# Patient Record
Sex: FEMALE | Race: WHITE | NOT HISPANIC OR LATINO | Employment: FULL TIME | URBAN - METROPOLITAN AREA
[De-identification: names, ages, dates, MRNs, and addresses within clinical notes are randomized per-mention and may not be internally consistent; named-entity substitution may affect disease eponyms.]

---

## 2017-03-13 ENCOUNTER — OFFICE VISIT (OUTPATIENT)
Dept: FAMILY MEDICINE | Facility: CLINIC | Age: 47
End: 2017-03-13
Payer: COMMERCIAL

## 2017-03-13 VITALS
DIASTOLIC BLOOD PRESSURE: 62 MMHG | TEMPERATURE: 98.6 F | HEART RATE: 74 BPM | HEIGHT: 65 IN | BODY MASS INDEX: 27.57 KG/M2 | RESPIRATION RATE: 16 BRPM | OXYGEN SATURATION: 97 % | WEIGHT: 165.5 LBS | SYSTOLIC BLOOD PRESSURE: 122 MMHG

## 2017-03-13 DIAGNOSIS — R19.8 SUPRAPUBIC FULLNESS: ICD-10-CM

## 2017-03-13 DIAGNOSIS — N94.9 VAGINAL SYMPTOM: ICD-10-CM

## 2017-03-13 DIAGNOSIS — R39.89 URINARY PROBLEM: Primary | ICD-10-CM

## 2017-03-13 LAB
ALBUMIN UR-MCNC: NEGATIVE MG/DL
APPEARANCE UR: ABNORMAL
BILIRUB UR QL STRIP: NEGATIVE
COLOR UR AUTO: YELLOW
GLUCOSE UR STRIP-MCNC: NEGATIVE MG/DL
HGB UR QL STRIP: ABNORMAL
KETONES UR STRIP-MCNC: NEGATIVE MG/DL
LEUKOCYTE ESTERASE UR QL STRIP: NEGATIVE
MICRO REPORT STATUS: NORMAL
NITRATE UR QL: NEGATIVE
PH UR STRIP: 6.5 PH (ref 5–7)
RBC #/AREA URNS AUTO: NORMAL /HPF (ref 0–2)
SP GR UR STRIP: 1.02 (ref 1–1.03)
SPECIMEN SOURCE: NORMAL
URN SPEC COLLECT METH UR: ABNORMAL
UROBILINOGEN UR STRIP-ACNC: 0.2 EU/DL (ref 0.2–1)
WBC #/AREA URNS AUTO: NORMAL /HPF (ref 0–2)
WET PREP SPEC: NORMAL

## 2017-03-13 PROCEDURE — 81001 URINALYSIS AUTO W/SCOPE: CPT | Performed by: PHYSICIAN ASSISTANT

## 2017-03-13 PROCEDURE — 87210 SMEAR WET MOUNT SALINE/INK: CPT | Performed by: PHYSICIAN ASSISTANT

## 2017-03-13 PROCEDURE — 99213 OFFICE O/P EST LOW 20 MIN: CPT | Performed by: PHYSICIAN ASSISTANT

## 2017-03-13 NOTE — NURSING NOTE
"Chief Complaint   Patient presents with     Urinary Problem     since last night       Initial /62 (BP Location: Right arm, Patient Position: Chair, Cuff Size: Adult Large)  Pulse 74  Temp 98.6  F (37  C) (Tympanic)  Resp 16  Ht 5' 5\" (1.651 m)  Wt 165 lb 8 oz (75.1 kg)  LMP 09/22/2004  SpO2 97%  Breastfeeding? No  BMI 27.54 kg/m2 Estimated body mass index is 27.54 kg/(m^2) as calculated from the following:    Height as of this encounter: 5' 5\" (1.651 m).    Weight as of this encounter: 165 lb 8 oz (75.1 kg).  Medication Reconciliation: complete     Patient would like to be notified at the following phone number for results from this visit   [phone] OK to leave message or Artis Gibson CMA (AAMA) 3/13/2017 1:51 PM      "

## 2017-03-13 NOTE — PROGRESS NOTES
SUBJECTIVE:                                                    Valeri Wise is a 47 year old female who presents to clinic today for the following health issues:    URINARY TRACT SYMPTOMS      Duration: since last night    Description  Urgency, fullness feeling in bladder after urination    Intensity:  mild    Accompanying signs and symptoms:  Fever/chills: no   Flank pain no   Nausea and vomiting: no   Vaginal symptoms: none  Abdominal/Pelvic Pain: YES- cramping sensation last night    History  History of frequent UTI's: no   History of kidney stones: no   Sexually Active: YES  Possibility of pregnancy: No    Precipitating or alleviating factors: None    Therapies tried and outcome: Probiotic   Outcome: No relief      -Patient presents with a new onset abd cramping last night   -this caused her to strain a BM quite hard and since constant urinary  urgency  -Since then she has needed to urinate quite frequently, now urinating frequently though not great amounts  -there is no painful urinary, no bleeding  -there is a bit more vaginal discharge than normal, otherwise normal  -BMs were normal  -there is no other abdominal pain other than the suprapubic pressure    Problem list and histories reviewed & adjusted, as indicated.  Additional history: as documented    Patient Active Problem List   Diagnosis     Disturbance in sleep behavior     HYPERLIPIDEMIA LDL GOAL <160     DDD (degenerative disc disease), cervical     Seasonal allergic rhinitis     Anxiety     Past Surgical History   Procedure Laterality Date     C nonspecific procedure       Jaw surgery--corrective     C nonspecific procedure            C vaginal hysterectomy  10/2004     Hysterectomy, pap no longer indicated         Social History   Substance Use Topics     Smoking status: Former Smoker     Smokeless tobacco: Never Used      Comment:      Alcohol use No     Family History   Problem Relation Age of Onset     HEART DISEASE Maternal  "Grandfather      CANCER Maternal Grandfather      leukemia     DIABETES Maternal Grandfather      Breast Cancer No family hx of      Cancer - colorectal No family hx of      C.A.D. Maternal Grandmother            Reviewed and updated as needed this visit by clinical staff       Reviewed and updated as needed this visit by Provider         ROS:  Constitutional, HEENT, cardiovascular, pulmonary, gi and gu systems are negative, except as otherwise noted.    OBJECTIVE:                                                    /62 (BP Location: Right arm, Patient Position: Chair, Cuff Size: Adult Large)  Pulse 74  Temp 98.6  F (37  C) (Tympanic)  Resp 16  Ht 5' 5\" (1.651 m)  Wt 165 lb 8 oz (75.1 kg)  LMP 09/22/2004  SpO2 97%  Breastfeeding? No  BMI 27.54 kg/m2  Body mass index is 27.54 kg/(m^2).  GENERAL: healthy, alert and no distress  RESP: lungs clear to auscultation - no rales, rhonchi or wheezes  CV: regular rates and rhythm  ABDOMEN: tenderness suprapubic, no organomegaly or masses and bowel sounds hyperactive   (female): normal female external genitalia; vaginal mucosa pink, moist, well rugated; No evident cervix. No obvious evidence for prolapse on manual or speculum exam while supine, and with valsalva  MS: no gross musculoskeletal defects noted, no edema    Diagnostic Test Results:  Results for orders placed or performed in visit on 03/13/17 (from the past 24 hour(s))   UA reflex to Microscopic and Culture   Result Value Ref Range    Color Urine Yellow     Appearance Urine Slightly Cloudy     Glucose Urine Negative NEG mg/dL    Bilirubin Urine Negative NEG    Ketones Urine Negative NEG mg/dL    Specific Gravity Urine 1.020 1.003 - 1.035    Blood Urine Small (A) NEG    pH Urine 6.5 5.0 - 7.0 pH    Protein Albumin Urine Negative NEG mg/dL    Urobilinogen Urine 0.2 0.2 - 1.0 EU/dL    Nitrite Urine Negative NEG    Leukocyte Esterase Urine Negative NEG    Source Midstream Urine    Wet prep   Result Value Ref Range "    Specimen Description Vagina     Wet Prep       No yeast seen  No clue cells seen  No Trichomonas seen      Micro Report Status FINAL 03/13/2017    Urine Microscopic   Result Value Ref Range    WBC Urine O - 2 0 - 2 /HPF    RBC Urine O - 2 0 - 2 /HPF        ASSESSMENT/PLAN:                                                    1. Urinary problem  2. Suprapubic fullness  3. Vaginal symptom  46yo female with new onset symptoms beginning yesterday following straining during BM last night. UA is grossly normal save for small hematuria. She continues to have urgency and frequency with little output. I do wonder about prolapse given the symptoms and would like her to follow up closely with obgyn who will be able to better investigate. Graciously Dr. Hodgson has agreed to squeeze her in early tomorrow.   - UA reflex to Microscopic and Culture; Future  - Wet prep; Future  - UA reflex to Microscopic and Culture  - Wet prep  - Urine Microscopic  - OB/GYN REFERRAL    Ryan Keating PA-C  [facility]

## 2017-03-13 NOTE — MR AVS SNAPSHOT
After Visit Summary   3/13/2017    Valeri Wise    MRN: [id number]           Patient Information     Date Of Birth          1970        Visit Information        Provider Department      3/13/2017 1:20 PM Ryan Keating PA-C [facility]        Today's Diagnoses     Urinary problem    -  1    Suprapubic fullness        Vaginal symptom           Follow-ups after your visit        Additional Services     OB/GYN REFERRAL       Your provider has referred you to:  [facility]: [facility] [phone]   [email or web address]    Please be aware that coverage of these services is subject to the terms and limitations of your health insurance plan.  Call member services at your health plan with any benefit or coverage questions.      Please bring the following with you to your appointment:    (1) Any X-Rays, CTs or MRIs which have been performed.  Contact the facility where they were done to arrange for  prior to your scheduled appointment.   (2) List of current medications   (3) This referral request   (4) Any documents/labs given to you for this referral                  Who to contact     If you have questions or need follow up information about today's clinic visit or your schedule please contact [facility] directly at [phone].  Normal or non-critical lab and imaging results will be communicated to you by MyChart, letter or phone within 4 business days after the clinic has received the results. If you do not hear from us within 7 days, please contact the clinic through MyChart or phone. If you have a critical or abnormal lab result, we will notify you by phone as soon as possible.  Submit refill requests through [email or web address] or call your pharmacy and they will forward the refill request to us. Please allow 3 business days for your refill to be completed.          Additional Information About Your Visit        MyChart Information   "   [employer] gives you secure access to your electronic health record. If you see a primary care provider, you can also send messages to your care team and make appointments. If you have questions, please call your primary care clinic.  If you do not have a primary care provider, please call [phone] and they will assist you.        Care EveryWhere ID     This is your Care EveryWhere ID. This could be used by other organizations to access your [place] medical records  [id number]        Your Vitals Were     Pulse Temperature Respirations Height Last Period Pulse Oximetry    74 98.6  F (37  C) (Tympanic) 16 5' 5\" (1.651 m) 09/22/2004 97%    Breastfeeding? BMI (Body Mass Index)                No 27.54 kg/m2           Blood Pressure from Last 3 Encounters:   03/13/17 122/62   12/05/16 126/78   09/22/15 132/80    Weight from Last 3 Encounters:   03/13/17 165 lb 8 oz (75.1 kg)   12/05/16 166 lb 14.4 oz (75.7 kg)   09/22/15 162 lb (73.5 kg)              We Performed the Following     OB/GYN REFERRAL     UA reflex to Microscopic and Culture     Urine Microscopic     Wet prep        Primary Care Provider Office Phone # Fax #    Tatiana Oseguera [phone]       [facility] [address] [facility] [address]        Thank you!     Thank you for choosing [facility]  for your care. Our goal is always to provide you with excellent care. Hearing back from our patients is one way we can continue to improve our services. Please take a few minutes to complete the written survey that you may receive in the mail after your visit with us. Thank you!             Your Updated Medication List - Protect others around you: Learn how to safely use, store and throw away your medicines at www.disposemymeds.org.          This list is accurate as of: 3/13/17  2:37 PM.  Always use your most recent med list.                   Brand Name Dispense Instructions for use    citalopram 20 MG " tablet    celeXA    90 tablet    Take 1 tablet (20 mg) by mouth daily       gabapentin 300 MG capsule    NEURONTIN    270 capsule    Take 1 capsule (300 mg) by mouth 3 times daily

## 2017-03-28 ENCOUNTER — OFFICE VISIT (OUTPATIENT)
Dept: OBGYN | Facility: CLINIC | Age: 47
End: 2017-03-28
Payer: COMMERCIAL

## 2017-03-28 VITALS
DIASTOLIC BLOOD PRESSURE: 80 MMHG | SYSTOLIC BLOOD PRESSURE: 122 MMHG | HEART RATE: 72 BPM | BODY MASS INDEX: 27.12 KG/M2 | WEIGHT: 163 LBS

## 2017-03-28 DIAGNOSIS — Z01.411 ENCOUNTER FOR GYNECOLOGICAL EXAMINATION WITH ABNORMAL FINDING: ICD-10-CM

## 2017-03-28 LAB
FSH SERPL-ACNC: 5.3 IU/L
LH SERPL-ACNC: 13.2 IU/L

## 2017-03-28 PROCEDURE — 87624 HPV HI-RISK TYP POOLED RSLT: CPT | Performed by: OBSTETRICS & GYNECOLOGY

## 2017-03-28 PROCEDURE — G0145 SCR C/V CYTO,THINLAYER,RESCR: HCPCS | Performed by: OBSTETRICS & GYNECOLOGY

## 2017-03-28 PROCEDURE — 83002 ASSAY OF GONADOTROPIN (LH): CPT | Performed by: OBSTETRICS & GYNECOLOGY

## 2017-03-28 PROCEDURE — 83001 ASSAY OF GONADOTROPIN (FSH): CPT | Performed by: OBSTETRICS & GYNECOLOGY

## 2017-03-28 PROCEDURE — 36415 COLL VENOUS BLD VENIPUNCTURE: CPT | Performed by: OBSTETRICS & GYNECOLOGY

## 2017-03-28 PROCEDURE — 99203 OFFICE O/P NEW LOW 30 MIN: CPT | Performed by: OBSTETRICS & GYNECOLOGY

## 2017-03-28 NOTE — MR AVS SNAPSHOT
After Visit Summary   3/28/2017    Valeri Wise    MRN: [id number]           Patient Information     Date Of Birth          1970        Visit Information        Provider Department      3/28/2017 2:00 PM Wilfredo Hodgson MD [facility]        Today's Diagnoses     Cystocele, unspecified cystocele location    -  1    Encounter for gynecological examination with abnormal finding           Follow-ups after your visit        Follow-up notes from your care team     Return in about 1 year (around 3/28/2018), or if symptoms worsen or fail to improve.      Who to contact     If you have questions or need follow up information about today's clinic visit or your schedule please contact [facility] directly at [phone].  Normal or non-critical lab and imaging results will be communicated to you by MyChart, letter or phone within 4 business days after the clinic has received the results. If you do not hear from us within 7 days, please contact the clinic through Rebelle[employer] or phone. If you have a critical or abnormal lab result, we will notify you by phone as soon as possible.  Submit refill requests through [employer] or call your pharmacy and they will forward the refill request to us. Please allow 3 business days for your refill to be completed.          Additional Information About Your Visit        MyChart Information     [employer] gives you secure access to your electronic health record. If you see a primary care provider, you can also send messages to your care team and make appointments. If you have questions, please call your primary care clinic.  If you do not have a primary care provider, please call [phone] and they will assist you.        Care EveryWhere ID     This is your Care EveryWhere ID. This could be used by other organizations to access your [place] medical records  [id number]        Your Vitals Were     Pulse Last Period BMI (Body Mass Index)             72  09/22/2004 27.12 kg/m2          Blood Pressure from Last 3 Encounters:   03/28/17 122/80   03/13/17 122/62   12/05/16 126/78    Weight from Last 3 Encounters:   03/28/17 163 lb (73.9 kg)   03/13/17 165 lb 8 oz (75.1 kg)   12/05/16 166 lb 14.4 oz (75.7 kg)              We Performed the Following     Follicle stimulating hormone     Lutropin     Pap imaged thin layer screen with HPV - recommended age 30 - 65 years (select HPV order below)        Primary Care Provider Office Phone # Fax #    Tatiana Oseguera [phone]       [facility] [address] [facility] [address]        Thank you!     Thank you for choosing [facility]  for your care. Our goal is always to provide you with excellent care. Hearing back from our patients is one way we can continue to improve our services. Please take a few minutes to complete the written survey that you may receive in the mail after your visit with us. Thank you!             Your Updated Medication List - Protect others around you: Learn how to safely use, store and throw away your medicines at www.disposemymeds.org.          This list is accurate as of: 3/28/17 11:59 PM.  Always use your most recent med list.                   Brand Name Dispense Instructions for use    citalopram 20 MG tablet    celeXA    90 tablet    Take 1 tablet (20 mg) by mouth daily       gabapentin 300 MG capsule    NEURONTIN    270 capsule    Take 1 capsule (300 mg) by mouth 3 times daily

## 2017-03-28 NOTE — NURSING NOTE
"Chief Complaint   Patient presents with     Consult     discuss urinary issues - referred by PA in [place] - history of hysterectomy - pap smear - last pap smear 08/29/12       Initial /80 (BP Location: Right arm, Patient Position: Chair, Cuff Size: Adult Regular)  Pulse 72  Wt 163 lb (73.9 kg)  LMP 09/22/2004  BMI 27.12 kg/m2 Estimated body mass index is 27.12 kg/(m^2) as calculated from the following:    Height as of 3/13/17: 5' 5\" (1.651 m).    Weight as of this encounter: 163 lb (73.9 kg).  Medication Reconciliation: complete    Nurse assisted visit.  Chika Waller MA.    "

## 2017-03-28 NOTE — PROGRESS NOTES
SUBJECTIVE:  Valeri Wise is an 47 year old  P2 woman who presents for pap smear and concern that       -pelvic structures are falling out        Does not note loss of urine with cough or sneeze/does note urgency; symptoms        -present over last 2-3 months.  Recent U/A; negative              Vaginal hysterectomy in           Past Medical History:   Diagnosis Date     Disc degeneration     neck     Past Surgical History:   Procedure Laterality Date     C NONSPECIFIC PROCEDURE      Jaw surgery--corrective     C NONSPECIFIC PROCEDURE           C VAGINAL HYSTERECTOMY  10/2004     Current Outpatient Prescriptions   Medication     gabapentin (NEURONTIN) 300 MG capsule     citalopram (CELEXA) 20 MG tablet     No current facility-administered medications for this visit.      Allergies   Allergen Reactions     Sulfa Drugs      hives     Social History   Substance Use Topics     Smoking status: Former Smoker     Smokeless tobacco: Never Used      Comment:      Alcohol use No       Review of Systems  CONSTITUTIONAL:NEGATIVE  EYES: NEGATIVE  ENT/MOUTH: NEGATIVE  RESP: NEGATIVE  CV: NEGATIVE  GI: NEGATIVE  : NEGATIVE    OBJECTIVE:  /80 (BP Location: Right arm, Patient Position: Chair, Cuff Size: Adult Regular)  Pulse 72  Wt 163 lb (73.9 kg)  LMP 2004  BMI 27.12 kg/m2   EXAM:  GENERAL APPEARANCE: healthy, alert and no distress  ABDOMEN: soft, nontender, without hepatosplenomegaly or masses    Pelvic Exam:  Vulva: No external lesions, normal hair distribution, no adenopathy  Vagina: moderate cystocele      Pap smear of vaginal cuff-done      ASSESSMENT/PLAN:  Pelvic organ prolapse (anterior compartment)      -pathophysiology of pelvic organ prolapse reviewed over 15 minutes       -recommend vaginal estrogen and Kegel exercises      -may need urodynamics if symptoms do not improve  Urinary urgency  Pap smear            Health Maintenance   Topic Date Due     PAP Q3 YR  2015      LIPID SCREEN Q5 YR FEMALE (SYSTEM ASSIGNED)  08/29/2017     INFLUENZA VACCINE (SYSTEM ASSIGNED)  09/01/2017     TETANUS Q10 YR  08/29/2022

## 2017-03-30 LAB
COPATH REPORT: NORMAL
PAP: NORMAL

## 2017-04-03 LAB
FINAL DIAGNOSIS: NORMAL
HPV HR 12 DNA CVX QL NAA+PROBE: NEGATIVE
HPV16 DNA SPEC QL NAA+PROBE: NEGATIVE
HPV18 DNA SPEC QL NAA+PROBE: NEGATIVE
SPECIMEN DESCRIPTION: NORMAL

## 2017-06-16 DIAGNOSIS — F41.9 ANXIETY: ICD-10-CM

## 2017-06-16 RX ORDER — CITALOPRAM HYDROBROMIDE 20 MG/1
20 TABLET ORAL DAILY
Qty: 30 TABLET | Refills: 0 | Status: SHIPPED | OUTPATIENT
Start: 2017-06-16 | End: 2017-07-12

## 2017-06-16 NOTE — TELEPHONE ENCOUNTER
Patient will run out of this medication prior to appt. Please send rx to pharmacy if approving or call patient with questions or concerns.    Citalopram (CELEXA) 20 MG tablet       Last Written Prescription Date: 12/05/2016  Last Fill Quantity: 90, # refills: 1  [facility] Office [facility] with [facility] primary care provider:  03/13/2017   Next 5 appointments (look out 90 days)     Jun 27, 2017  2:30 PM CDT   Office Visit with Tatiana Oseguera MD   [facility]    [address] [place] [id number] [phone]                   Last PHQ-9 score on record=   PHQ-9 SCORE 12/5/2016   Total Score -   Total Score 10

## 2017-06-16 NOTE — TELEPHONE ENCOUNTER
Medication is being filled for 1 time refill only due to:  upcoming appt.   Jocelyn Ibarra, RN  Triage Nurse

## 2017-07-12 ENCOUNTER — OFFICE VISIT (OUTPATIENT)
Dept: FAMILY MEDICINE | Facility: CLINIC | Age: 47
End: 2017-07-12
Payer: COMMERCIAL

## 2017-07-12 VITALS
BODY MASS INDEX: 27.26 KG/M2 | RESPIRATION RATE: 16 BRPM | WEIGHT: 163.8 LBS | DIASTOLIC BLOOD PRESSURE: 68 MMHG | TEMPERATURE: 98 F | SYSTOLIC BLOOD PRESSURE: 128 MMHG | OXYGEN SATURATION: 98 % | HEART RATE: 72 BPM

## 2017-07-12 DIAGNOSIS — F41.9 ANXIETY: Primary | ICD-10-CM

## 2017-07-12 DIAGNOSIS — M50.30 DDD (DEGENERATIVE DISC DISEASE), CERVICAL: ICD-10-CM

## 2017-07-12 PROCEDURE — 99214 OFFICE O/P EST MOD 30 MIN: CPT | Performed by: INTERNAL MEDICINE

## 2017-07-12 RX ORDER — CITALOPRAM HYDROBROMIDE 20 MG/1
20 TABLET ORAL DAILY
Qty: 90 TABLET | Refills: 1 | Status: SHIPPED | OUTPATIENT
Start: 2017-07-12 | End: 2018-01-10

## 2017-07-12 ASSESSMENT — ANXIETY QUESTIONNAIRES
7. FEELING AFRAID AS IF SOMETHING AWFUL MIGHT HAPPEN: SEVERAL DAYS
6. BECOMING EASILY ANNOYED OR IRRITABLE: SEVERAL DAYS
GAD7 TOTAL SCORE: 7
IF YOU CHECKED OFF ANY PROBLEMS ON THIS QUESTIONNAIRE, HOW DIFFICULT HAVE THESE PROBLEMS MADE IT FOR YOU TO DO YOUR WORK, TAKE CARE OF THINGS AT HOME, OR GET ALONG WITH OTHER PEOPLE: SOMEWHAT DIFFICULT
3. WORRYING TOO MUCH ABOUT DIFFERENT THINGS: SEVERAL DAYS
5. BEING SO RESTLESS THAT IT IS HARD TO SIT STILL: SEVERAL DAYS
2. NOT BEING ABLE TO STOP OR CONTROL WORRYING: SEVERAL DAYS
1. FEELING NERVOUS, ANXIOUS, OR ON EDGE: SEVERAL DAYS

## 2017-07-12 ASSESSMENT — PATIENT HEALTH QUESTIONNAIRE - PHQ9: 5. POOR APPETITE OR OVEREATING: SEVERAL DAYS

## 2017-07-12 NOTE — NURSING NOTE
"Chief Complaint   Patient presents with     Anxiety     states feels like the Citalopram 20 mg may be making her a little to sleepy      Recheck Medication       Initial /68 (BP Location: Right arm, Patient Position: Chair, Cuff Size: Adult Large)  Pulse 72  Temp 98  F (36.7  C) (Oral)  Resp 16  Wt 163 lb 12.8 oz (74.3 kg)  LMP 09/22/2004  SpO2 98%  BMI 27.26 kg/m2 Estimated body mass index is 27.26 kg/(m^2) as calculated from the following:    Height as of 3/13/17: 5' 5\" (1.651 m).    Weight as of this encounter: 163 lb 12.8 oz (74.3 kg).  Medication Reconciliation: complete    "

## 2017-07-12 NOTE — PATIENT INSTRUCTIONS
Begin taking same dose of citalopram (20 MG) in the morning instead of at night     Continue taking Gabapentin at night as before.

## 2017-07-12 NOTE — MR AVS SNAPSHOT
After Visit Summary   7/12/2017    Valeri Wise    MRN: [id number]           Patient Information     Date Of Birth          1970        Visit Information        Provider Department      7/12/2017 2:30 PM Tatiana Oseguera MD [facility]        Today's Diagnoses     Anxiety    -  1    DDD (degenerative disc disease), cervical          Care Instructions    Begin taking same dose of citalopram (20 MG) in the morning instead of at night     Continue taking Gabapentin at night as before.          Follow-ups after your visit        Who to contact     If you have questions or need follow up information about today's clinic visit or your schedule please contact [facility] directly at [phone].  Normal or non-critical lab and imaging results will be communicated to you by CashYouhart, letter or phone within 4 business days after the clinic has received the results. If you do not hear from us within 7 days, please contact the clinic through [employer]You[employer] or phone. If you have a critical or abnormal lab result, we will notify you by phone as soon as possible.  Submit refill requests through [employer] or call your pharmacy and they will forward the refill request to us. Please allow 3 business days for your refill to be completed.          Additional Information About Your Visit        MyChart Information     [employer] gives you secure access to your electronic health record. If you see a primary care provider, you can also send messages to your care team and make appointments. If you have questions, please call your primary care clinic.  If you do not have a primary care provider, please call [phone] and they will assist you.        Care EveryWhere ID     This is your Care EveryWhere ID. This could be used by other organizations to access your [place] medical records  [id number]        Your Vitals Were     Pulse Temperature Respirations Last Period Pulse Oximetry BMI (Body  Mass Index)    72 98  F (36.7  C) (Oral) 16 09/22/2004 98% 27.26 kg/m2       Blood Pressure from Last 3 Encounters:   07/12/17 128/68   03/28/17 122/80   03/13/17 122/62    Weight from Last 3 Encounters:   07/12/17 163 lb 12.8 oz (74.3 kg)   03/28/17 163 lb (73.9 kg)   03/13/17 165 lb 8 oz (75.1 kg)              Today, you had the following     No orders found for display         Where to get your medicines      These medications were sent to [facility]/pharmacy #[id number] - [place], [place] - [address]  [address], [facility] [address]     Phone:  [phone]     citalopram 20 MG tablet          Primary Care Provider Office Phone # Fax #    Tatiana Oseguera [phone]       [facility] [address] [facility] [address]        Equal Access to Services     KAVON RÍOS AH: Hadii aad ku hadasho Soomaali, waaxda luqadaha, qaybta kaalmada adeegyada, waxay idiin hayaan adeeg kharash la'nickien . So [facility] [phone].    ATENCIÓN: Si habla español, tiene a barnes disposición servicios gratuitos de asistencia lingüística. Llame al [phone].    We comply with applicable federal civil rights laws and [place] laws. We do not discriminate on the basis of race, color, national origin, age, disability sex, sexual orientation or gender identity.            Thank you!     Thank you for choosing [facility]  for your care. Our goal is always to provide you with excellent care. Hearing back from our patients is one way we can continue to improve our services. Please take a few minutes to complete the written survey that you may receive in the mail after your visit with us. Thank you!             Your Updated Medication List - Protect others around you: Learn how to safely use, store and throw away your medicines at www.disposemymeds.org.          This list is accurate as of: 7/12/17  3:47 PM.  Always use your most recent med list.                   Brand Name Dispense  Instructions for use Diagnosis    citalopram 20 MG tablet    celeXA    90 tablet    Take 1 tablet (20 mg) by mouth daily    Anxiety       gabapentin 300 MG capsule    NEURONTIN    270 capsule    Take 1 capsule (300 mg) by mouth 3 times daily    Cervicalgia, DDD (degenerative disc disease), cervical

## 2017-07-12 NOTE — PROGRESS NOTES
SUBJECTIVE:                                                    Valeri Wise is a 47 year old female who presents to clinic today for the following health issues:    Anxiety Follow-Up    Status since last visit: Improved but states that she feels very tired with the increased dose of 20 mg and wonders if there is a dose that is in between.     Other associated symptoms:None    Complicating factors:   Significant life event: No   Current substance abuse: None  Depression symptoms: No  ALIYA-7 SCORE 8/28/2014 9/22/2015 12/5/2016   Total Score 7 - -   Total Score - 4 9       GAD7    Pt has a history of anxiety and is currently taking citalopram.  She feels that her recently increased dose is effective in helping her mood, but is making her very tired.    She states she takes the citalopram at night and is still tired during the day.  The patient claims that she would be very happy with her medication if she wasn't so tired.  She is requesting a change in order to reduce her fatigue.        Amount of exercise or physical activity: None    Problems taking medications regularly: No    Medication side effects: sleepiness and fatigue    Diet: regular (no restrictions)    Problem list and histories reviewed & adjusted, as indicated.  Additional history: as documented    Labs reviewed in EPIC    Reviewed and updated as needed this visit by clinical staff  Tobacco  Allergies  Meds  Med Hx  Surg Hx  Fam Hx  Soc Hx      Reviewed and updated as needed this visit by Provider       REVIEW OF SYSTEMS:  C: Positive for fatigue; NEGATIVE for fever, chills, or change in weight  R: NEGATIVE for cough or shortness of breath  CV: NEGATIVE for chest pain, palpitations or peripheral edema  M: Positive for neck pain - use of Gabapentin; past injections as recommended by [facility];   N: NEGATIVE for weakness, dizziness or paresthesias; past injections, continued use of Gabapentin  E: Hx of hyperlipidemia - no use  of statin;   P: Anxiety - use of citalopram reviewed; feels medication is effective    This document serves as a record of the services and decisions personally performed and made by Tatiana Oseguera MD. It was created on her behalf by Chaka Cummings, a trained medical scribe. The creation of this document is based the provider's statements to the medical scribe.  Chaka Cummings, July 12, 2017 3:37 PM    OBJECTIVE:   /68 (BP Location: Right arm, Patient Position: Chair, Cuff Size: Adult Large)  Pulse 72  Temp 98  F (36.7  C) (Oral)  Resp 16  Wt 163 lb 12.8 oz (74.3 kg)  LMP 09/22/2004  SpO2 98%  BMI 27.26 kg/m2  Body mass index is 27.26 kg/(m^2).    EXAM:  GENERAL: Patient appears healthy, alert and not distressed.  NECK: No adenopathy present, no asymmetry, masses, or scars noted, thyroid is normal to palpation  RESP: Lungs are clear to auscultation - no rales, rhonchi or wheezes present  CV: Regular rate and rhythm, normal S1 S2 heart sounds, no ectopy, no peripheral edema present, peripheral pulses normal, no carotid bruit.  PSYCH: Mentation appears normal, affect is normal/bright    Diagnostic Test Results:  No results found for this or any previous visit (from the past 24 hour(s)).     ASSESSMENT/PLAN:     (F41.9) Anxiety  (primary encounter diagnosis)  Comment: Citalopram 20 mg well tolerated; she is taking at night; she feels it is effective for anxiety; consider change to morning administration  Plan: Begin taking citalopram (CELEXA) 20 MG tablet in the morning instead of at night.          (M50.30) DDD (degenerative disc disease), cervical  Comment: followed by [facility] in the past; past injections.  Plan: Pt elected to not continue injections, taking gabapentin per neurology    The information in this document, created by a medical scribe for me, accurately reflects the services I personally performed and the decisions made by me. I have reviewed and approved this document for  accuracy.  Dr. Tatiana Oseguera, July 12, 2017, 3:49 PM    Tatiana Oseguera MD  Internal Medicine  [facility]

## 2017-07-13 ASSESSMENT — PATIENT HEALTH QUESTIONNAIRE - PHQ9: SUM OF ALL RESPONSES TO PHQ QUESTIONS 1-9: 6

## 2017-07-13 ASSESSMENT — ANXIETY QUESTIONNAIRES: GAD7 TOTAL SCORE: 7

## 2018-01-15 ENCOUNTER — MYC REFILL (OUTPATIENT)
Dept: FAMILY MEDICINE | Facility: CLINIC | Age: 48
End: 2018-01-15

## 2018-01-15 ENCOUNTER — E-VISIT (OUTPATIENT)
Dept: FAMILY MEDICINE | Facility: CLINIC | Age: 48
End: 2018-01-15
Payer: COMMERCIAL

## 2018-01-15 DIAGNOSIS — M50.30 DDD (DEGENERATIVE DISC DISEASE), CERVICAL: ICD-10-CM

## 2018-01-15 DIAGNOSIS — M54.2 CERVICALGIA: ICD-10-CM

## 2018-01-15 DIAGNOSIS — F41.9 ANXIETY: ICD-10-CM

## 2018-01-15 PROCEDURE — 99444 ZZC PHYSICIAN ONLINE EVALUATION & MANAGEMENT SERVICE: CPT | Performed by: INTERNAL MEDICINE

## 2018-01-15 RX ORDER — CITALOPRAM HYDROBROMIDE 20 MG/1
TABLET ORAL
Qty: 90 TABLET | Refills: 1 | Status: SHIPPED | OUTPATIENT
Start: 2018-01-15 | End: 2018-09-11

## 2018-01-15 RX ORDER — GABAPENTIN 300 MG/1
300 CAPSULE ORAL 3 TIMES DAILY
Qty: 270 CAPSULE | Refills: 1 | Status: SHIPPED | OUTPATIENT
Start: 2018-01-15 | End: 2018-07-29

## 2018-01-15 RX ORDER — GABAPENTIN 300 MG/1
300 CAPSULE ORAL 3 TIMES DAILY
Qty: 270 CAPSULE | Refills: 3 | Status: CANCELLED | OUTPATIENT
Start: 2018-01-15

## 2018-01-15 ASSESSMENT — ANXIETY QUESTIONNAIRES
7. FEELING AFRAID AS IF SOMETHING AWFUL MIGHT HAPPEN: SEVERAL DAYS
1. FEELING NERVOUS, ANXIOUS, OR ON EDGE: SEVERAL DAYS
5. BEING SO RESTLESS THAT IT IS HARD TO SIT STILL: SEVERAL DAYS
GAD7 TOTAL SCORE: 7
7. FEELING AFRAID AS IF SOMETHING AWFUL MIGHT HAPPEN: SEVERAL DAYS
6. BECOMING EASILY ANNOYED OR IRRITABLE: SEVERAL DAYS
2. NOT BEING ABLE TO STOP OR CONTROL WORRYING: SEVERAL DAYS
GAD7 TOTAL SCORE: 7
GAD7 TOTAL SCORE: 7
4. TROUBLE RELAXING: SEVERAL DAYS
3. WORRYING TOO MUCH ABOUT DIFFERENT THINGS: SEVERAL DAYS

## 2018-01-15 ASSESSMENT — PATIENT HEALTH QUESTIONNAIRE - PHQ9
SUM OF ALL RESPONSES TO PHQ QUESTIONS 1-9: 7
SUM OF ALL RESPONSES TO PHQ QUESTIONS 1-9: 7
10. IF YOU CHECKED OFF ANY PROBLEMS, HOW DIFFICULT HAVE THESE PROBLEMS MADE IT FOR YOU TO DO YOUR WORK, TAKE CARE OF THINGS AT HOME, OR GET ALONG WITH OTHER PEOPLE: SOMEWHAT DIFFICULT

## 2018-01-16 ASSESSMENT — ANXIETY QUESTIONNAIRES: GAD7 TOTAL SCORE: 7

## 2018-01-16 ASSESSMENT — PATIENT HEALTH QUESTIONNAIRE - PHQ9: SUM OF ALL RESPONSES TO PHQ QUESTIONS 1-9: 7

## 2018-01-16 NOTE — TELEPHONE ENCOUNTER
Message from [employer]:  Original authorizing provider: MD Valeri Piedra would like a refill of the following medications:  gabapentin (NEURONTIN) 300 MG capsule [Tatiana Oseguera MD]    Preferred pharmacy: [facility]Y [id number] - [facility] [address]    Comment:  I have completed the e-visit questionnaires and request a renewal of my gabapentin medicine as soon as possible. Thank you.

## 2018-01-20 ENCOUNTER — TELEPHONE (OUTPATIENT)
Dept: FAMILY MEDICINE | Facility: CLINIC | Age: 48
End: 2018-01-20

## 2018-07-29 DIAGNOSIS — M54.2 CERVICALGIA: ICD-10-CM

## 2018-07-29 DIAGNOSIS — M50.30 DDD (DEGENERATIVE DISC DISEASE), CERVICAL: ICD-10-CM

## 2018-07-29 DIAGNOSIS — F41.9 ANXIETY: ICD-10-CM

## 2018-07-30 NOTE — TELEPHONE ENCOUNTER
"Requested Prescriptions   Pending Prescriptions Disp Refills     gabapentin (NEURONTIN) 300 MG capsule [Pharmacy Med Name: GABAPENTIN 300 MG CAPSULE]  Last Written Prescription Date:  1/15/18  Last Fill Quantity: 270,  # refills: 1   Last office visit: 7/12/2017 with prescribing provider:  Tatiana Oseguera MD   Future Office Visit:   Next 5 appointments (look out 90 days)     Aug 13, 2018  2:30 PM CDT   [facility] Tat[facility]    [address] [facility] [address]   [phone]                  270 capsule 1     Sig: TAKE 1 CAPSULE (300 MG) BY MOUTH 3 TIMES DAILY    There is no refill protocol information for this order        citalopram (CELEXA) 20 MG tablet [Pharmacy Med Name: CITALOPRAM HBR 20 MG TABLET]  Last Written Prescription Date:  1/15/18  Last Fill Quantity: 90,  # refills: 1   Last office visit: 7/12/2017 with prescribing provider:  Tatiana Oseguera MD   Future Office Visit:   Next 5 appointments (look out 90 days)     Aug 13, 2018  2:30 PM CDT   Office Visit with Tatiana Oseguera MD   [facility]    [address] [facility] [address] [phone]                  90 tablet 1     Sig: TAKE 1 TABLET (20 MG) BY MOUTH DAILY    SSRIs Protocol Passed    7/29/2018  4:58 PM  PHQ-9 SCORE 12/5/2016 7/12/2017 1/15/2018   Total Score - - -   Total Score MyChart - - 7 (Mild depression)   Total Score 10 6 7     ALIYA-7 SCORE 12/5/2016 7/12/2017 1/15/2018   Total Score - - -   Total Score - - 7 (mild anxiety)   Total Score 9 7 7              Passed - Recent (12 mo) or future (30 days) visit within the authorizing provider's specialty    Patient had office visit in the last 12 months or has a visit in the next 30 days with authorizing provider or within the authorizing provider's specialty.  See \"Patient Info\" tab in inbasket, or \"Choose Columns\" in Meds & Orders " section of the refill encounter.           Passed - Patient is age 18 or older       Passed - No active pregnancy on record       Passed - No positive pregnancy test in last 12 months

## 2018-08-01 NOTE — TELEPHONE ENCOUNTER
Routing refill request to provider for review/approval because:  Drug not on the [facility] refill protocol and due for an appt, has one scheduled 8/13/18

## 2018-08-03 RX ORDER — CITALOPRAM HYDROBROMIDE 20 MG/1
TABLET ORAL
Qty: 30 TABLET | Refills: 0 | Status: SHIPPED | OUTPATIENT
Start: 2018-08-03 | End: 2018-08-28

## 2018-08-03 RX ORDER — GABAPENTIN 300 MG/1
CAPSULE ORAL
Qty: 90 CAPSULE | Refills: 0 | Status: SHIPPED | OUTPATIENT
Start: 2018-08-03 | End: 2018-09-11

## 2018-08-03 NOTE — TELEPHONE ENCOUNTER
Chart reviewed; pt had E-Visit 1/15/2018 and was due to follow up in 6 months- 7/15/2018.  appt not scheduled until 8/13.  Will fill interim RX but will need to follow up BEFORE Rx runs out in the future.

## 2018-08-28 DIAGNOSIS — F41.9 ANXIETY: ICD-10-CM

## 2018-08-28 RX ORDER — CITALOPRAM HYDROBROMIDE 20 MG/1
TABLET ORAL
Qty: 30 TABLET | Refills: 0 | Status: SHIPPED | OUTPATIENT
Start: 2018-08-28 | End: 2018-09-11

## 2018-08-28 NOTE — TELEPHONE ENCOUNTER
Requested Prescriptions   Pending Prescriptions Disp Refills     citalopram (CELEXA) 20 MG tablet 30 tablet 0    There is no refill protocol information for this order        Patient would like a refill of this medication. She does not have enough to last until her next appt on 9/11/18 with Dr. Oseguera.

## 2018-08-28 NOTE — TELEPHONE ENCOUNTER
"Medication is being filled for 1 time refill only due to:  Patient needs to be seen because it has been more than one year since last visit. Needs f/u on anxiety meds.   Huddled with Dr. Oseguera on this and she is in agreement. Patient must keep Sept 11 appt before further refills.    Last Written Prescription Date:  8/3/18  Last Fill Quantity: 30,  # refills: 0   Last office visit: 7/12/2017 with prescribing provider:  Ana Rosa   Future Office Visit:   Next 5 appointments (look out 90 days)     Sep 11, 2018  3:30 PM CDT   Office Visit with Tatiana Oseguera MD   [facility]    [address] [place] [address] [phone]                 Requested Prescriptions   Pending Prescriptions Disp Refills     citalopram (CELEXA) 20 MG tablet 30 tablet 0    SSRIs Protocol Passed    8/28/2018 11:57 AM       Passed - Recent (12 mo) or future (30 days) visit within the authorizing provider's specialty    Patient had office visit in the last 12 months or has a visit in the next 30 days with authorizing provider or within the authorizing provider's specialty.  See \"Patient Info\" tab in inbasket, or \"Choose Columns\" in Meds & Orders section of the refill encounter.           Passed - Patient is age 18 or older       Passed - No active pregnancy on record       Passed - No positive pregnancy test in last 12 months        Mike Chicas RN    "

## 2018-09-11 ENCOUNTER — OFFICE VISIT (OUTPATIENT)
Dept: FAMILY MEDICINE | Facility: CLINIC | Age: 48
End: 2018-09-11
Payer: COMMERCIAL

## 2018-09-11 VITALS
DIASTOLIC BLOOD PRESSURE: 80 MMHG | SYSTOLIC BLOOD PRESSURE: 124 MMHG | TEMPERATURE: 98.2 F | WEIGHT: 159.1 LBS | OXYGEN SATURATION: 97 % | HEART RATE: 70 BPM | BODY MASS INDEX: 26.51 KG/M2 | HEIGHT: 65 IN | RESPIRATION RATE: 15 BRPM

## 2018-09-11 DIAGNOSIS — M54.2 CERVICALGIA: ICD-10-CM

## 2018-09-11 DIAGNOSIS — F41.9 ANXIETY: ICD-10-CM

## 2018-09-11 DIAGNOSIS — M50.30 DDD (DEGENERATIVE DISC DISEASE), CERVICAL: ICD-10-CM

## 2018-09-11 PROCEDURE — 99214 OFFICE O/P EST MOD 30 MIN: CPT | Performed by: INTERNAL MEDICINE

## 2018-09-11 RX ORDER — GABAPENTIN 300 MG/1
CAPSULE ORAL
Qty: 270 CAPSULE | Refills: 3 | Status: SHIPPED | OUTPATIENT
Start: 2018-09-11 | End: 2019-09-24

## 2018-09-11 RX ORDER — CITALOPRAM HYDROBROMIDE 20 MG/1
TABLET ORAL
Qty: 90 TABLET | Refills: 3 | Status: SHIPPED | OUTPATIENT
Start: 2018-09-11 | End: 2020-01-20

## 2018-09-11 ASSESSMENT — ANXIETY QUESTIONNAIRES
2. NOT BEING ABLE TO STOP OR CONTROL WORRYING: NOT AT ALL
5. BEING SO RESTLESS THAT IT IS HARD TO SIT STILL: NOT AT ALL
6. BECOMING EASILY ANNOYED OR IRRITABLE: SEVERAL DAYS
7. FEELING AFRAID AS IF SOMETHING AWFUL MIGHT HAPPEN: NOT AT ALL
GAD7 TOTAL SCORE: 4
IF YOU CHECKED OFF ANY PROBLEMS ON THIS QUESTIONNAIRE, HOW DIFFICULT HAVE THESE PROBLEMS MADE IT FOR YOU TO DO YOUR WORK, TAKE CARE OF THINGS AT HOME, OR GET ALONG WITH OTHER PEOPLE: SOMEWHAT DIFFICULT
3. WORRYING TOO MUCH ABOUT DIFFERENT THINGS: SEVERAL DAYS
1. FEELING NERVOUS, ANXIOUS, OR ON EDGE: SEVERAL DAYS

## 2018-09-11 ASSESSMENT — PATIENT HEALTH QUESTIONNAIRE - PHQ9: 5. POOR APPETITE OR OVEREATING: SEVERAL DAYS

## 2018-09-11 NOTE — PROGRESS NOTES
SUBJECTIVE:   Valeri Wise is a 48 year old female who presents to clinic today for the following health issues:        Anxiety Follow-Up    Status since last visit: stable    Other associated symptoms:None    Complicating factors:   Significant life event: Yes-  Daughter has gone off to college   Current substance abuse: None  Depression symptoms: No  ALIYA-7 SCORE 2017 1/15/2018 2018   Total Score - - -   Total Score - 7 (mild anxiety) -   Total Score 7 7 4       ALIYA-7    Amount of exercise or physical activity: None    Problems taking medications regularly: No    Medication side effects: none    Diet: gluten-free/reduced        Neck Pain      Duration: longstanding ; years;  Past MRI with chronic changes.     Description:  Location: neck  Radiation: at times has radiation down her arm    No change in symptoms and Gabapentin helps with the discomfort; past evaluation, no injections.    Intensity:  mild    Accompanying signs and symptoms: none    History (similar episodes/previous evaluation): None    Precipitating or alleviating factors: disc bulge     Therapies tried and outcome: gabapentin       Problem list and histories reviewed & adjusted, as indicated.  Additional history: as documented    Patient Active Problem List   Diagnosis     Disturbance in sleep behavior     HYPERLIPIDEMIA LDL GOAL <160     DDD (degenerative disc disease), cervical     Seasonal allergic rhinitis     Anxiety     Cystocele, unspecified cystocele location     BELLE II (cervical intraepithelial neoplasia II)     Past Surgical History:   Procedure Laterality Date     C NONSPECIFIC PROCEDURE      Jaw surgery--corrective     C NONSPECIFIC PROCEDURE           C VAGINAL HYSTERECTOMY  10/2004       Social History   Substance Use Topics     Smoking status: Former Smoker     Smokeless tobacco: Never Used      Comment:      Alcohol use No     Family History   Problem Relation Age of Onset     HEART DISEASE Maternal  "Grandfather      Cancer Maternal Grandfather      leukemia     Diabetes Maternal Grandfather      C.A.D. Maternal Grandmother      Breast Cancer No family hx of      Cancer - colorectal No family hx of          Current Outpatient Prescriptions   Medication Sig Dispense Refill     citalopram (CELEXA) 20 MG tablet TAKE 1 TABLET (20 MG) BY MOUTH DAILY 90 tablet 3     gabapentin (NEURONTIN) 300 MG capsule TAKE 1 CAPSULE (300 MG) BY MOUTH 3 TIMES DAILY 270 capsule 3     Allergies   Allergen Reactions     Sulfa Drugs      hives     BP Readings from Last 3 Encounters:   09/11/18 124/80   07/12/17 128/68   03/28/17 122/80    Wt Readings from Last 3 Encounters:   09/11/18 159 lb 1.6 oz (72.2 kg)   07/12/17 163 lb 12.8 oz (74.3 kg)   03/28/17 163 lb (73.9 kg)                  Labs reviewed in EPIC    Reviewed and updated as needed this visit by clinical staff  Tobacco  Allergies  Meds  Problems  Med Hx  Surg Hx  Fam Hx  Soc Hx        Reviewed and updated as needed this visit by Provider  Allergies  Meds  Problems         ROS:  CONSTITUTIONAL: NEGATIVE for fever, chills, change in weight  ENT/MOUTH: NEGATIVE for ear, mouth and throat problems  RESP: NEGATIVE for significant cough or SOB  CV: NEGATIVE for chest pain, palpitations or peripheral edema  GI: NEGATIVE for nausea, abdominal pain, heartburn, or change in bowel habits  MUSCULOSKELETAL: chronic neck pain and periodic radicular symptoms; not recent  NEURO: denies weakness; periodic radicular symptoms L>R  ENDOCRINE: NEGATIVE for temperature intolerance, skin/hair changes  PSYCHIATRIC: anxious; stressors reviewed;     OBJECTIVE:     /80  Pulse 70  Temp 98.2  F (36.8  C) (Oral)  Resp 15  Ht 5' 4.6\" (1.641 m)  Wt 159 lb 1.6 oz (72.2 kg)  LMP 09/22/2004  SpO2 97%  BMI 26.8 kg/m2  Body mass index is 26.8 kg/(m^2).  GENERAL: healthy, alert and no distress  NECK: no adenopathy, no asymmetry, masses, or scars and thyroid normal to palpation  RESP: lungs " clear to auscultation - no rales, rhonchi or wheezes  CV: regular rates and rhythm, normal S1 S2, no S3 or S4, no murmur, click or rub, peripheral pulses strong and no peripheral edema  ABDOMEN: soft, nontender, no hepatosplenomegaly, no masses and bowel sounds normal  MS: extremities normal- no gross deformities noted  SKIN: no suspicious lesions or rashes  NEURO: Normal strength and tone, sensory exam grossly normal, mentation intact and DTR's normal and symmetric   PSYCH: mentation appears normal, affect normal/bright  PHQ-9 SCORE 7/12/2017 1/15/2018 9/11/2018   Total Score - - -   Total Score MyChart - 7 (Mild depression) -   Total Score 6 7 4     ALIYA-7 SCORE 7/12/2017 1/15/2018 9/11/2018   Total Score - - -   Total Score - 7 (mild anxiety) -   Total Score 7 7 4       ASSESSMENT/PLAN:     (F41.9) Anxiety  Comment: review use of med; feels Citalopram 20 mg effective;   Stressors reviewed; ALIYA and PHQ-9  Improved, both now 4  Plan: citalopram (CELEXA) 20 MG tablet          (M54.2) Cervicalgia  Comment: Cervical DDD; saw Neuro and had MRI 2007; reviewed MRI with pt; severe changes at C5-C6; strength preserved.  Plan: gabapentin (NEURONTIN) 300 MG capsule- reviewed dosing of medication.          (M50.30) DDD (degenerative disc disease), cervical  Comment: reviewed dosing of medication. Well tolerated  Plan: gabapentin (NEURONTIN) 300 MG capsule            Tatiana Oseguera MD  Internal Medicine   [facility]

## 2018-09-11 NOTE — MR AVS SNAPSHOT
"              After Visit Summary   9/11/2018    Valeri Wise    MRN: [id number]           Patient Information     Date Of Birth          1970        Visit Information        Provider Department      9/11/2018 3:30 PM Tatiana Oseguera MD [facility]        Today's Diagnoses     Anxiety        Cervicalgia        DDD (degenerative disc disease), cervical           Follow-ups after your visit        Follow-up notes from your care team     Return in about 1 year (around 9/11/2019).      Who to contact     If you have questions or need follow up information about today's clinic visit or your schedule please contact [facility] directly at [phone].  Normal or non-critical lab and imaging results will be communicated to you by MyChart, letter or phone within 4 business days after the clinic has received the results. If you do not hear from us within 7 days, please contact the clinic through [employer] or phone. If you have a critical or abnormal lab result, we will notify you by phone as soon as possible.  Submit refill requests through [employer] or call your pharmacy and they will forward the refill request to us. Please allow 3 business days for your refill to be completed.          Additional Information About Your Visit        MyChart Information     [employer] gives you secure access to your electronic health record. If you see a primary care provider, you can also send messages to your care team and make appointments. If you have questions, please call your primary care clinic.  If you do not have a primary care provider, please call [phone] and they will assist you.        Care EveryWhere ID     This is your Care EveryWhere ID. This could be used by other organizations to access your [place] medical records  [id number]        Your Vitals Were     Pulse Temperature Respirations Height Last Period Pulse Oximetry    70 98.2  F (36.8  C) (Oral) 15 5' 4.6\" (1.641 m) 09/22/2004 " 97%    BMI (Body Mass Index)                   26.8 kg/m2            Blood Pressure from Last 3 Encounters:   09/11/18 124/80   07/12/17 128/68   03/28/17 122/80    Weight from Last 3 Encounters:   09/11/18 159 lb 1.6 oz (72.2 kg)   07/12/17 163 lb 12.8 oz (74.3 kg)   03/28/17 163 lb (73.9 kg)              Today, you had the following     No orders found for display         Where to get your medicines      These medications were sent to [facility]/pharmacy #[id number] - [place], [place] - [address]  [address], [facility] [address]     Phone:  [phone]     citalopram 20 MG tablet    gabapentin 300 MG capsule          Primary Care Provider Office Phone # Fax #    Tatiana Isacc Oseguera [phone] [address] [address] [facility] [address]        Equal Access to Services     [facility]: Hadii aad ku hadasho Soomaali, waaxda luqadaha, qaybta kaalmada adeegyada, waxay anyiin haynickien oksana palacios . So [facility] [phone].    ATENCIÓN: Si habla español, tiene a barnes disposición servicios gratuitos de asistencia lingüística. Llame al [phone].    We comply with applicable federal civil rights laws and [place] laws. We do not discriminate on the basis of race, color, national origin, age, disability, sex, sexual orientation, or gender identity.            Thank you!     Thank you for choosing [facility]  for your care. Our goal is always to provide you with excellent care. Hearing back from our patients is one way we can continue to improve our services. Please take a few minutes to complete the written survey that you may receive in the mail after your visit with us. Thank you!             Your Updated Medication List - Protect others around you: Learn how to safely use, store and throw away your medicines at www.disposemymeds.org.          This list is accurate as of 9/11/18  4:28 PM.  Always use your most recent med list.                   Brand Name Dispense  Instructions for use Diagnosis    citalopram 20 MG tablet    celeXA    90 tablet    TAKE 1 TABLET (20 MG) BY MOUTH DAILY    Anxiety       gabapentin 300 MG capsule    NEURONTIN    270 capsule    TAKE 1 CAPSULE (300 MG) BY MOUTH 3 TIMES DAILY    Cervicalgia, DDD (degenerative disc disease), cervical

## 2018-09-12 ASSESSMENT — ANXIETY QUESTIONNAIRES: GAD7 TOTAL SCORE: 4

## 2018-09-12 ASSESSMENT — PATIENT HEALTH QUESTIONNAIRE - PHQ9: SUM OF ALL RESPONSES TO PHQ QUESTIONS 1-9: 4

## 2019-09-24 DIAGNOSIS — F41.9 ANXIETY: ICD-10-CM

## 2019-09-24 DIAGNOSIS — M50.30 DDD (DEGENERATIVE DISC DISEASE), CERVICAL: ICD-10-CM

## 2019-09-24 DIAGNOSIS — M54.2 CERVICALGIA: ICD-10-CM

## 2019-09-24 NOTE — TELEPHONE ENCOUNTER
"Requested Prescriptions   Pending Prescriptions Disp Refills     citalopram (CELEXA) 20 MG tablet [Pharmacy Med Name: CITALOPRAM HBR 20 MG TABLET] 90 tablet 0     Sig: TAKE 1 TABLET BY MOUTH EVERY DAY   Last Written Prescription Date:  6/25/19  Last Fill Quantity: 90,  # refills: 0   Last office visit: 9/11/2018 with prescribing provider:  Tatiana Oseguera MD   Future Office Visit:   Next 5 appointments (look out 90 days)    Nov 07, 2019  3:30 PM CST  Office Visit with Tatiana Oseguera MD  [facility] ([facility] [address] [facility] [address] [phone]             SSRIs Protocol Failed - 9/24/2019  3:16 PM   PHQ-9 SCORE 7/12/2017 1/15/2018 9/11/2018   PHQ-9 Total Score - - -   PHQ-9 Total Score MyChart - 7 (Mild depression) -   PHQ-9 Total Score 6 7 4     ALIYA-7 SCORE 7/12/2017 1/15/2018 9/11/2018   Total Score - - -   Total Score - 7 (mild anxiety) -   Total Score 7 7 4            Failed - Recent (12 mo) or future (30 days) visit within the authorizing provider's specialty     Patient had office visit in the last 12 months or has a visit in the next 30 days with authorizing provider or within the authorizing provider's specialty.  See \"Patient Info\" tab in inbasket, or \"Choose Columns\" in Meds & Orders section of the refill encounter.              Passed - Medication is active on med list        Passed - Patient is age 18 or older        Passed - No active pregnancy on record        Passed - No positive pregnancy test in last 12 months        gabapentin (NEURONTIN) 300 MG capsule [Pharmacy Med Name: GABAPENTIN 300 MG CAPSULE] 270 capsule 3     Sig: TAKE 1 CAPSULE (300 MG) BY MOUTH 3 TIMES DAILY   Last Written Prescription Date:  9/11/18  Last Fill Quantity: 270,  # refills: 3   Last office visit: 9/11/2018 with prescribing provider:  Tatiana Oseguera MD   Future Office Visit:   Next 5 appointments (look out 90 days)    Nov 07, 2019  3:30 PM " CST  Office Visit with Tatiana Oseguera MD  [facility] [address] [facility] [address] [phone]

## 2019-09-25 RX ORDER — GABAPENTIN 300 MG/1
CAPSULE ORAL
Qty: 180 CAPSULE | Refills: 0 | Status: SHIPPED | OUTPATIENT
Start: 2019-09-25 | End: 2020-01-20

## 2019-09-25 RX ORDER — CITALOPRAM HYDROBROMIDE 20 MG/1
TABLET ORAL
Qty: 90 TABLET | Refills: 0 | Status: SHIPPED | OUTPATIENT
Start: 2019-09-25 | End: 2019-12-24

## 2019-09-25 NOTE — TELEPHONE ENCOUNTER
Gabapentin    [place]  last [place];    6/25/2019, 1/30/2019, 10/8/2018 qty 270 per DN.    Routing refill request to provider for review/approval because:  Drug not on the [facility] refill protocol     Medication Celexa is being filled for 1 time refill only due to:  Patient needs to be seen because it has been more than one year since last visit.     Patient has appt 11/7 with pcp.    Kylie Mcleod RN

## 2019-10-04 ENCOUNTER — HEALTH MAINTENANCE LETTER (OUTPATIENT)
Age: 49
End: 2019-10-04

## 2019-12-23 DIAGNOSIS — F41.9 ANXIETY: ICD-10-CM

## 2019-12-24 RX ORDER — CITALOPRAM HYDROBROMIDE 20 MG/1
TABLET ORAL
Qty: 90 TABLET | Refills: 0 | Status: SHIPPED | OUTPATIENT
Start: 2019-12-24 | End: 2020-01-20

## 2019-12-24 NOTE — TELEPHONE ENCOUNTER
"Requested Prescriptions   Pending Prescriptions Disp Refills     citalopram (CELEXA) 20 MG tablet [Pharmacy Med Name: CITALOPRAM HBR 20 MG TABLET] 90 tablet 0     Sig: TAKE 1 TABLET BY MOUTH EVERY DAY   Last Written Prescription Date:  9/25/19  Last Fill Quantity: 90,  # refills: 0   Last office visit: 9/11/2018 with prescribing provider:  Tatiana Oseguera MD   Future Office Visit:   Next 5 appointments (look out 90 days)    Jan 20, 2020 11:00 AM CST  Office Visit with Tatiana Oseguera MD  [facility] ([facility] [address] [facility] [address] [phone]             SSRIs Protocol Passed - 12/23/2019  2:54 AM   PHQ-9 SCORE 7/12/2017 1/15/2018 9/11/2018   PHQ-9 Total Score - - -   PHQ-9 Total Score MyChart - 7 (Mild depression) -   PHQ-9 Total Score 6 7 4     ALIYA-7 SCORE 7/12/2017 1/15/2018 9/11/2018   Total Score - - -   Total Score - 7 (mild anxiety) -   Total Score 7 7 4            Passed - Recent (12 mo) or future (30 days) visit within the authorizing provider's specialty     Patient has had an office visit with the authorizing provider or a provider within the authorizing providers department within the previous 12 mos or has a future within next 30 days. See \"Patient Info\" tab in inbasket, or \"Choose Columns\" in Meds & Orders section of the refill encounter.              Passed - Medication is active on med list        Passed - Patient is age 18 or older        Passed - No active pregnancy on record        Passed - No positive pregnancy test in last 12 months         "

## 2020-01-20 ENCOUNTER — OFFICE VISIT (OUTPATIENT)
Dept: FAMILY MEDICINE | Facility: CLINIC | Age: 50
End: 2020-01-20
Payer: COMMERCIAL

## 2020-01-20 VITALS
HEIGHT: 65 IN | DIASTOLIC BLOOD PRESSURE: 68 MMHG | BODY MASS INDEX: 27.12 KG/M2 | HEART RATE: 73 BPM | SYSTOLIC BLOOD PRESSURE: 112 MMHG | OXYGEN SATURATION: 96 % | RESPIRATION RATE: 15 BRPM | TEMPERATURE: 98 F | WEIGHT: 162.8 LBS

## 2020-01-20 DIAGNOSIS — F41.9 ANXIETY: ICD-10-CM

## 2020-01-20 DIAGNOSIS — M54.2 CERVICALGIA: Primary | ICD-10-CM

## 2020-01-20 DIAGNOSIS — M50.30 DDD (DEGENERATIVE DISC DISEASE), CERVICAL: ICD-10-CM

## 2020-01-20 DIAGNOSIS — Z12.11 SPECIAL SCREENING FOR MALIGNANT NEOPLASMS, COLON: ICD-10-CM

## 2020-01-20 PROCEDURE — 99214 OFFICE O/P EST MOD 30 MIN: CPT | Performed by: INTERNAL MEDICINE

## 2020-01-20 RX ORDER — GABAPENTIN 300 MG/1
300 CAPSULE ORAL 3 TIMES DAILY
Qty: 270 CAPSULE | Refills: 3 | Status: SHIPPED | OUTPATIENT
Start: 2020-01-20 | End: 2020-12-22

## 2020-01-20 RX ORDER — CITALOPRAM HYDROBROMIDE 20 MG/1
20 TABLET ORAL DAILY
Qty: 90 TABLET | Refills: 3 | Status: SHIPPED | OUTPATIENT
Start: 2020-01-20 | End: 2021-03-29

## 2020-01-20 ASSESSMENT — ANXIETY QUESTIONNAIRES
6. BECOMING EASILY ANNOYED OR IRRITABLE: SEVERAL DAYS
5. BEING SO RESTLESS THAT IT IS HARD TO SIT STILL: SEVERAL DAYS
GAD7 TOTAL SCORE: 7
1. FEELING NERVOUS, ANXIOUS, OR ON EDGE: SEVERAL DAYS
2. NOT BEING ABLE TO STOP OR CONTROL WORRYING: SEVERAL DAYS
IF YOU CHECKED OFF ANY PROBLEMS ON THIS QUESTIONNAIRE, HOW DIFFICULT HAVE THESE PROBLEMS MADE IT FOR YOU TO DO YOUR WORK, TAKE CARE OF THINGS AT HOME, OR GET ALONG WITH OTHER PEOPLE: SOMEWHAT DIFFICULT
7. FEELING AFRAID AS IF SOMETHING AWFUL MIGHT HAPPEN: SEVERAL DAYS
3. WORRYING TOO MUCH ABOUT DIFFERENT THINGS: SEVERAL DAYS

## 2020-01-20 ASSESSMENT — PATIENT HEALTH QUESTIONNAIRE - PHQ9
SUM OF ALL RESPONSES TO PHQ QUESTIONS 1-9: 6
5. POOR APPETITE OR OVEREATING: SEVERAL DAYS

## 2020-01-20 ASSESSMENT — MIFFLIN-ST. JEOR: SCORE: 1351.4

## 2020-01-20 NOTE — PROGRESS NOTES
Subjective     Valeri Wise is a 49 year old female who presents to clinic today for the following health issues:    HPI   Anxiety Follow-Up    How are you doing with your anxiety since your last visit? stable    Are you having other symptoms that might be associated with anxiety? No    Have you had a significant life event? No     Are you feeling depressed? No    Do you have any concerns with your use of alcohol or other drugs? No    Social History     Tobacco Use     Smoking status: Former Smoker     Smokeless tobacco: Never Used     Tobacco comment: 2001   Substance Use Topics     Alcohol use: No     Drug use: No     ALIYA-7 SCORE 7/12/2017 1/15/2018 9/11/2018   Total Score - - -   Total Score - 7 (mild anxiety) -   Total Score 7 7 4     PHQ 7/12/2017 1/15/2018 9/11/2018   PHQ-9 Total Score 6 7 4   Q9: Thoughts of better off dead/self-harm past 2 weeks Not at all Not at all Not at all     Last PHQ-9 1/20/2020   1.  Little interest or pleasure in doing things 1   2.  Feeling down, depressed, or hopeless 1   3.  Trouble falling or staying asleep, or sleeping too much 1   4.  Feeling tired or having little energy 2   5.  Poor appetite or overeating 0   6.  Feeling bad about yourself 0   7.  Trouble concentrating 1   8.  Moving slowly or restless 0   Q9: Thoughts of better off dead/self-harm past 2 weeks 0   PHQ-9 Total Score 6   Difficulty at work, home, or with people Somewhat difficult     ALIYA-7  1/20/2020   1. Feeling nervous, anxious, or on edge 1   2. Not being able to stop or control worrying 1   3. Worrying too much about different things 1   4. Trouble relaxing 1   5. Being so restless that it is hard to sit still 1   6. Becoming easily annoyed or irritable 1   7. Feeling afraid, as if something awful might happen 1   ALIYA-7 Total Score 7   If you checked any problems, how difficult have they made it for you to do your work, take care of things at home, or get along with other people? Somewhat difficult        Neck pain and Generalized body aches:     Feels like her symptoms are getting worse and affecting more of her body.     Pt would like to increase Gabapentin to 3 daily Neck, mid back and hips follow up on Gabapentin tingling and aching noted in the front of her thighs. tingling in her feet and hands.       How many servings of fruits and vegetables do you eat daily?  4 or more    On average, how many sweetened beverages do you drink each day (Examples: soda, juice, sweet tea, etc.  Do NOT count diet or artificially sweetened beverages)?   0    How many days per week do you exercise enough to make your heart beat faster? 0    How many minutes a day do you exercise enough to make your heart beat faster? 0  How many days per week do you miss taking your medication? 1 day every few weeks     What makes it hard for you to take your medications?  remembering to take      Patient Active Problem List   Diagnosis     Disturbance in sleep behavior     HYPERLIPIDEMIA LDL GOAL <160     DDD (degenerative disc disease), cervical     Seasonal allergic rhinitis     Anxiety     Cystocele, unspecified cystocele location     BELLE II (cervical intraepithelial neoplasia II)     Past Surgical History:   Procedure Laterality Date     C NONSPECIFIC PROCEDURE      Jaw surgery--corrective     C NONSPECIFIC PROCEDURE           C VAGINAL HYSTERECTOMY  10/2004       Social History     Tobacco Use     Smoking status: Former Smoker     Smokeless tobacco: Never Used     Tobacco comment:    Substance Use Topics     Alcohol use: No     Family History   Problem Relation Age of Onset     Heart Disease Maternal Grandfather      Cancer Maternal Grandfather         leukemia     Diabetes Maternal Grandfather      C.A.D. Maternal Grandmother      Breast Cancer No family hx of      Cancer - colorectal No family hx of          Current Outpatient Medications   Medication Sig Dispense Refill     citalopram (CELEXA) 20 MG tablet Take 1  "tablet (20 mg) by mouth daily 90 tablet 3     gabapentin (NEURONTIN) 300 MG capsule Take 1 capsule (300 mg) by mouth 3 times daily 270 capsule 3     Allergies   Allergen Reactions     Sulfa Drugs      hives     Recent Labs   Lab Test 11/11/15 08/29/12  1229   LDL  --  127   HDL  --  37*   TRIG  --  100   ALT 25 13   CR 0.90 0.78   GFRESTIMATED >60 81   GFRESTBLACK >60 >90   POTASSIUM 4.0 3.8   TSH 4.180  --       BP Readings from Last 3 Encounters:   01/20/20 112/68   09/11/18 124/80   07/12/17 128/68    Wt Readings from Last 3 Encounters:   01/20/20 73.8 kg (162 lb 12.8 oz)   09/11/18 72.2 kg (159 lb 1.6 oz)   07/12/17 74.3 kg (163 lb 12.8 oz)           Reviewed and updated as needed this visit by Provider  Tobacco  Allergies  Meds  Problems  Med Hx  Surg Hx  Fam Hx         Review of Systems   ROS COMP: CONSTITUTIONAL: NEGATIVE for fever, chills, change in weight  INTEGUMENTARY/SKIN: NEGATIVE for worrisome rashes, moles or lesions  ENT/MOUTH: NEGATIVE for ear, mouth and throat problems  RESP: NEGATIVE for significant cough or SOB  CV: NEGATIVE for chest pain, palpitations or peripheral edema  MUSCULOSKELETAL: cervicalgia and upper back tightness; no radicular symptoms.   NEURO: NEGATIVE for weakness, dizziness or paresthesias  PSYCHIATRIC: NEGATIVE for changes in mood or affect      Objective    /68   Pulse 73   Temp 98  F (36.7  C) (Oral)   Resp 15   Ht 1.638 m (5' 4.5\")   Wt 73.8 kg (162 lb 12.8 oz)   LMP 09/22/2004   SpO2 96%   BMI 27.51 kg/m    Body mass index is 27.51 kg/m .  Physical Exam   GENERAL: healthy, alert and no distress  NECK: no adenopathy, no asymmetry, masses, or scars and thyroid normal to palpation  RESP: lungs clear to auscultation - no rales, rhonchi or wheezes  CV: regular rate and rhythm, normal S1 S2, no S3 or S4, no murmur, click or rub, no peripheral edema and peripheral pulses strong  ABDOMEN: soft, nontender and bowel sounds normal  MS: increased tone of upper back " "and trapezius; full range of motion of all extremities; extremities normal- no gross deformities noted  NEURO: Normal strength and tone, sensory exam grossly normal and mentation intact; reflexes symmetric; strength preserved.   PSYCH: mentation appears normal, affect normal/bright    Diagnostic Test Results:  Labs reviewed in Epic        Assessment & Plan     (M54.2) Cervicalgia  (primary encounter diagnosis)  Comment: Cervical DDD; saw Neuro ([facility])and had MRI 2007;  severe changes at C5-C6  Plan: gabapentin (NEURONTIN) 300 MG capsule using 3 per day more often;         Overall, symptoms fluctuation and needing 3 doses more frequently; past Neurology evaluation and prescribed Gabapentin.     (F41.9) Anxiety  Comment: feeling well on Citalopram 20 mg; effective dose of medications. Continue same medication.  Plan: citalopram (CELEXA) 20 MG tablet          (M50.30) DDD (degenerative disc disease), cervical  Comment: Cervical DDD; saw Neuro ([facility])and had MRI 2007;  severe changes at C5-C6  Plan: gabapentin (NEURONTIN) 300 MG capsule using 3 per day more often;         Overall, symptoms fluctuation and needing 3 doses more frequently; past Neurology evaluation and prescribed Gabapentin.          (Z12.11) Special screening for malignant neoplasms, colon  Comment: age 49 due for colon screening; she will take referral but will likely not proceed until summer months;  Plan: GASTROENTEROLOGY ADULT REF PROCEDURE ONLY             BMI:   Estimated body mass index is 27.51 kg/m  as calculated from the following:    Height as of this encounter: 1.638 m (5' 4.5\").    Weight as of this encounter: 73.8 kg (162 lb 12.8 oz).   Weight management plan: Discussed healthy diet and exercise guidelines      Return in about 6 months (around 7/20/2020) for neck pain and use of Gabapentin .- [facility] requirements for ongoing assessment of controlled. Substances.     Tatiana Oseguera MD  Internal Medicine   [facility]      "

## 2020-01-21 ASSESSMENT — ANXIETY QUESTIONNAIRES: GAD7 TOTAL SCORE: 7

## 2020-11-08 ENCOUNTER — HEALTH MAINTENANCE LETTER (OUTPATIENT)
Age: 50
End: 2020-11-08

## 2020-12-22 DIAGNOSIS — M54.2 CERVICALGIA: ICD-10-CM

## 2020-12-22 DIAGNOSIS — M50.30 DDD (DEGENERATIVE DISC DISEASE), CERVICAL: ICD-10-CM

## 2020-12-22 RX ORDER — GABAPENTIN 300 MG/1
CAPSULE ORAL
Qty: 90 CAPSULE | Refills: 0 | Status: SHIPPED | OUTPATIENT
Start: 2020-12-22 | End: 2021-03-29

## 2020-12-22 NOTE — TELEPHONE ENCOUNTER
gabapentin (NEURONTIN) 300 MG capsule      Last Written Prescription Date:  1/20/2020  Last Fill Quantity: #270,   # refills: 3  Last Office Visit: 1/20/2020  Future Office visit:       Routing refill request to provider for review/approval because:  Drug not on the [facility], [facility] or [facility] refill protocol or controlled substance.    Janine Norman RN

## 2020-12-22 NOTE — LETTER
January 5, 2021      Valeri Wise  [address] [id number]        Dear MsMaddy Valeri Wise,    We are contacting you today to notify you that you are due for a medication follow up for further refills for your gabapentin.    This appointment can be in clinic or virtual by video.    Please call [phone] to schedule an appointment.     [facility]      Sincerely,     Tatiana Oseguera MD

## 2020-12-22 NOTE — TELEPHONE ENCOUNTER
Chart reviewed. Pt last seen 1/20/20.  Due for appt.   Will extend Rx for one month and ask team to assist with scheduling.

## 2021-03-28 ENCOUNTER — HEALTH MAINTENANCE LETTER (OUTPATIENT)
Age: 51
End: 2021-03-28

## 2021-03-29 ENCOUNTER — OFFICE VISIT (OUTPATIENT)
Dept: FAMILY MEDICINE | Facility: CLINIC | Age: 51
End: 2021-03-29
Payer: COMMERCIAL

## 2021-03-29 VITALS
DIASTOLIC BLOOD PRESSURE: 76 MMHG | RESPIRATION RATE: 18 BRPM | BODY MASS INDEX: 26.66 KG/M2 | TEMPERATURE: 98.2 F | HEART RATE: 79 BPM | HEIGHT: 65 IN | OXYGEN SATURATION: 95 % | WEIGHT: 160 LBS | SYSTOLIC BLOOD PRESSURE: 114 MMHG

## 2021-03-29 DIAGNOSIS — H61.21 IMPACTED CERUMEN OF RIGHT EAR: ICD-10-CM

## 2021-03-29 DIAGNOSIS — F41.9 ANXIETY: Primary | ICD-10-CM

## 2021-03-29 DIAGNOSIS — M50.30 DDD (DEGENERATIVE DISC DISEASE), CERVICAL: ICD-10-CM

## 2021-03-29 DIAGNOSIS — M54.2 CERVICALGIA: ICD-10-CM

## 2021-03-29 DIAGNOSIS — Z12.11 SCREEN FOR COLON CANCER: ICD-10-CM

## 2021-03-29 PROCEDURE — 99214 OFFICE O/P EST MOD 30 MIN: CPT | Performed by: INTERNAL MEDICINE

## 2021-03-29 RX ORDER — CITALOPRAM HYDROBROMIDE 20 MG/1
20 TABLET ORAL DAILY
Qty: 90 TABLET | Refills: 3 | Status: SHIPPED | OUTPATIENT
Start: 2021-03-29 | End: 2022-04-06

## 2021-03-29 RX ORDER — GABAPENTIN 300 MG/1
300 CAPSULE ORAL 3 TIMES DAILY
Qty: 90 CAPSULE | Refills: 11 | Status: SHIPPED | OUTPATIENT
Start: 2021-03-29 | End: 2022-04-06

## 2021-03-29 ASSESSMENT — ANXIETY QUESTIONNAIRES
GAD7 TOTAL SCORE: 2
IF YOU CHECKED OFF ANY PROBLEMS ON THIS QUESTIONNAIRE, HOW DIFFICULT HAVE THESE PROBLEMS MADE IT FOR YOU TO DO YOUR WORK, TAKE CARE OF THINGS AT HOME, OR GET ALONG WITH OTHER PEOPLE: SOMEWHAT DIFFICULT
7. FEELING AFRAID AS IF SOMETHING AWFUL MIGHT HAPPEN: NOT AT ALL
5. BEING SO RESTLESS THAT IT IS HARD TO SIT STILL: NOT AT ALL
1. FEELING NERVOUS, ANXIOUS, OR ON EDGE: SEVERAL DAYS
6. BECOMING EASILY ANNOYED OR IRRITABLE: SEVERAL DAYS
3. WORRYING TOO MUCH ABOUT DIFFERENT THINGS: NOT AT ALL
2. NOT BEING ABLE TO STOP OR CONTROL WORRYING: NOT AT ALL

## 2021-03-29 ASSESSMENT — PATIENT HEALTH QUESTIONNAIRE - PHQ9
5. POOR APPETITE OR OVEREATING: NOT AT ALL
SUM OF ALL RESPONSES TO PHQ QUESTIONS 1-9: 3

## 2021-03-29 ASSESSMENT — MIFFLIN-ST. JEOR: SCORE: 1328.7

## 2021-03-29 NOTE — NURSING NOTE
Patient identified using two patient identifiers.  Ear exam showing wax occlusion completed by provider.  Solution: warm water was placed in the right ear(s) via irrigation tool: elephant ear  .  Large amt of thick wax removed from right ear.    no diabetes and no thyroid trouble.

## 2021-03-29 NOTE — PROGRESS NOTES
"    Assessment & Plan     (F41.9) Anxiety  (primary encounter diagnosis)  Comment:Citalopram 20 mg effective  Plan: citalopram (CELEXA) 20 MG tablet          (M54.2) Cervicalgia  Comment: Cervical DDD; saw Neuro ([facility])and had MRI 2007;  severe changes at C5-C6; taking Gabapentin 12-5-Bedtime.  Plan: gabapentin (NEURONTIN) 300 MG capsule          (M50.30) DDD (degenerative disc disease), cervical  Comment: taking Gabapentin 12-5-Bedtime.  Plan: gabapentin (NEURONTIN) 300 MG capsule    (Z12.11) Screen for colon cancer  Comment: screening due;   Plan: GASTROENTEROLOGY ADULT REF PROCEDURE ONLY          (H61.21) Impacted cerumen of right ear  Comment: failed manual extraction with curette. Right cerumen deep in canal  Plan: irrigate       30 minutes spent on the date of the encounter doing chart review, review of test results, interpretation of tests, patient visit and documentation        BMI:   Estimated body mass index is 27.04 kg/m  as calculated from the following:    Height as of this encounter: 1.638 m (5' 4.5\").    Weight as of this encounter: 72.6 kg (160 lb).   Weight management plan: Discussed healthy diet and exercise guidelines    MEDICATIONS:   Orders Placed This Encounter   Medications     citalopram (CELEXA) 20 MG tablet     Sig: Take 1 tablet (20 mg) by mouth daily     Dispense:  90 tablet     Refill:  3     gabapentin (NEURONTIN) 300 MG capsule     Sig: Take 1 capsule (300 mg) by mouth 3 times daily     Dispense:  90 capsule     Refill:  11          - Continue other medications without change  FUTURE APPOINTMENTS:       - Follow-up for annual visit or as needed- offered options; she would like to schedule with [facility]  Due for PAP at vaginal cuff thru 2024 due to past pathology.    Work on weight loss  Regular exercise    No follow-ups on file.    Tatiana Oseguera MD  Internal Medicine   [facility] NOE Trammell is a 51 year old who presents " for the following health issues : she is due for physical with pap; offered options[facility]  Due for PAP at vaginal cuff thru 2024 due to past pathology.    HPI     Anxiety Follow-Up    How are you doing with your anxiety since your last visit? No change    Are you having other symptoms that might be associated with anxiety? No    Have you had a significant life event? OTHER: empty liz this year, to quiet at home     Are you feeling depressed? Yes:  a little bit during this time of quietness at home    Do you have any concerns with your use of alcohol or other drugs? No    Social History     Tobacco Use     Smoking status: Former Smoker     Packs/day: 0.50     Years: 5.00     Pack years: 2.50     Smokeless tobacco: Never Used     Tobacco comment: 2001   Substance Use Topics     Alcohol use: Yes     Comment: Once or twice a week     Drug use: No     ALIYA-7 SCORE 1/15/2018 9/11/2018 1/20/2020   Total Score - - -   Total Score 7 (mild anxiety) - -   Total Score 7 4 7     PHQ 1/15/2018 9/11/2018 1/20/2020   PHQ-9 Total Score 7 4 6   Q9: Thoughts of better off dead/self-harm past 2 weeks Not at all Not at all Not at all     Last PHQ-9 1/20/2020   1.  Little interest or pleasure in doing things 1   2.  Feeling down, depressed, or hopeless 1   3.  Trouble falling or staying asleep, or sleeping too much 1   4.  Feeling tired or having little energy 2   5.  Poor appetite or overeating 0   6.  Feeling bad about yourself 0   7.  Trouble concentrating 1   8.  Moving slowly or restless 0   Q9: Thoughts of better off dead/self-harm past 2 weeks 0   PHQ-9 Total Score 6   Difficulty at work, home, or with people Somewhat difficult     ALIYA-7  1/20/2020   1. Feeling nervous, anxious, or on edge 1   2. Not being able to stop or control worrying 1   3. Worrying too much about different things 1   4. Trouble relaxing 1   5. Being so restless that it is hard to sit still 1   6. Becoming easily  "annoyed or irritable 1   7. Feeling afraid, as if something awful might happen 1   ALIYA-7 Total Score 7   If you checked any problems, how difficult have they made it for you to do your work, take care of things at home, or get along with other people? Somewhat difficult         How many servings of fruits and vegetables do you eat daily?  4 or more    On average, how many sweetened beverages do you drink each day (Examples: soda, juice, sweet tea, etc.  Do NOT count diet or artificially sweetened beverages)?   0    How many days per week do you exercise enough to make your heart beat faster? 3 or less    How many minutes a day do you exercise enough to make your heart beat faster? 60 or more - seasonal    How many days per week do you miss taking your medication? 0    Medication Followup of Gabapentin  Cervical DDD- Gabapentin has been helpful; not too tired with meds.     Taking Medication as prescribed: yes    Side Effects:  None    Medication Helping Symptoms:  yes       Review of Systems   CONSTITUTIONAL: NEGATIVE for fever, chills, change in weight  ENT/MOUTH: right ear fullness; decreased hearing.  RESP: NEGATIVE for significant cough or SOB  CV: NEGATIVE for chest pain, palpitations or peripheral edema  GI: NEGATIVE for nausea, abdominal pain, heartburn, or change in bowel habits and due for colon screening,  MUSCULOSKELETAL: Cervical DDD; meds helpful   NEURO: cervical DDD; saw neurology in the past.  PSYCHIATRIC: anxiety, stressors reviewed      Objective    /76 (BP Location: Right arm, Patient Position: Sitting, Cuff Size: Adult Regular)   Pulse 79   Temp 98.2  F (36.8  C) (Oral)   Resp 18   Ht 1.638 m (5' 4.5\")   Wt 72.6 kg (160 lb)   LMP 09/22/2004   SpO2 95%   BMI 27.04 kg/m    Body mass index is 27.04 kg/m .  Physical Exam   GENERAL: healthy, alert and no distress  HENT: normal cephalic/atraumatic, right ear: occluded with wax, nose and mouth without ulcers or lesions, oropharynx clear " and oral mucous membranes moist  NECK: no adenopathy, no asymmetry, masses, or scars and thyroid normal to palpation  RESP: lungs clear to auscultation - no rales, rhonchi or wheezes  CV: regular rate and rhythm, normal S1 S2,   and peripheral pulses strong  ABDOMEN: soft, nontender and bowel sounds normal  MS: no gross musculoskeletal defects noted, no edema  NEURO: Normal strength and tone, sensory exam grossly normal and mentation intact  PSYCH: mentation appears normal, affect normal/bright

## 2021-03-30 ASSESSMENT — ANXIETY QUESTIONNAIRES: GAD7 TOTAL SCORE: 2

## 2021-06-29 ENCOUNTER — OFFICE VISIT (OUTPATIENT)
Dept: OBGYN | Facility: CLINIC | Age: 51
End: 2021-06-29
Payer: COMMERCIAL

## 2021-06-29 VITALS
WEIGHT: 153 LBS | HEIGHT: 64 IN | SYSTOLIC BLOOD PRESSURE: 106 MMHG | DIASTOLIC BLOOD PRESSURE: 78 MMHG | BODY MASS INDEX: 26.12 KG/M2

## 2021-06-29 DIAGNOSIS — Z12.31 VISIT FOR SCREENING MAMMOGRAM: ICD-10-CM

## 2021-06-29 DIAGNOSIS — N87.1 CIN II (CERVICAL INTRAEPITHELIAL NEOPLASIA II): Primary | ICD-10-CM

## 2021-06-29 PROCEDURE — 90750 HZV VACC RECOMBINANT IM: CPT | Performed by: OBSTETRICS & GYNECOLOGY

## 2021-06-29 PROCEDURE — 99386 PREV VISIT NEW AGE 40-64: CPT | Mod: 25 | Performed by: OBSTETRICS & GYNECOLOGY

## 2021-06-29 PROCEDURE — 90471 IMMUNIZATION ADMIN: CPT | Performed by: OBSTETRICS & GYNECOLOGY

## 2021-06-29 PROCEDURE — G0145 SCR C/V CYTO,THINLAYER,RESCR: HCPCS | Performed by: OBSTETRICS & GYNECOLOGY

## 2021-06-29 PROCEDURE — 87624 HPV HI-RISK TYP POOLED RSLT: CPT | Performed by: OBSTETRICS & GYNECOLOGY

## 2021-06-29 ASSESSMENT — MIFFLIN-ST. JEOR: SCORE: 1292.97

## 2021-06-29 NOTE — NURSING NOTE
"Chief Complaint   Patient presents with     Gyn Exam       Initial /78   Ht 1.632 m (5' 4.25\")   Wt 69.4 kg (153 lb)   LMP 2004   BMI 26.06 kg/m   Estimated body mass index is 26.06 kg/m  as calculated from the following:    Height as of this encounter: 1.632 m (5' 4.25\").    Weight as of this encounter: 69.4 kg (153 lb).  BP completed using cuff size: regular    Questioned patient about current smoking habits.  Pt. has never smoked.          The following HM Due: mammogram  pap smear  Vaccinations: Shingrix   Received Shingrix today : Experienced metalic taste in mouth immediately following injection.  Stayed in clinic for 30 min following vaccine.  No other symptoms noted and taste improved.       Donya Mai CMA               "

## 2021-06-29 NOTE — PROGRESS NOTES
Valeri is a 51 year old  who presents for annual exam.   Postmenopausal.  She is having no menopausal symptoms. No vaginal bleeding noted. She is S/P vaginal hysterectomy in .  BELLE 2 was noted on a preop biopsy in 3/2004.  Final path showed only CIN1    Besides routine health maintenance,  she would like to discuss some pelvic relaxation symptoms.  She is also interested in the Shingrex vaccine  GYNECOLOGIC HISTORY:  She is sexually active with  male partner.  History sexually transmitted infections:No STD history  Estrogen replacement therapy: No    History of abnormal Pap smear: Status post hysterectomy. Pap still indicated. BELLE 2 was seen on pre op biopsy 3/2004  Family history of breast CA: No  Family history of uterine/ovarian CA: No      HEALTH MAINTENANCE:  Reviewed.    HISTORY:  OB History    Para Term  AB Living   2 2 2 0 0 2   SAB TAB Ectopic Multiple Live Births   0 0 0 0 0      # Outcome Date GA Lbr Shaheed/2nd Weight Sex Delivery Anes PTL Lv   2 Term            1 Term              Past Medical History:   Diagnosis Date     Disc degeneration     neck     Uncomplicated asthma      Past Surgical History:   Procedure Laterality Date     C VAGINAL HYSTERECTOMY  10/2004     ZZ NONSPECIFIC PROCEDURE      Jaw surgery--corrective     ZZC NONSPECIFIC PROCEDURE           Family History   Problem Relation Age of Onset     Heart Disease Maternal Grandfather      Cancer Maternal Grandfather         leukemia     Diabetes Maternal Grandfather      Coronary Artery Disease Maternal Grandfather      C.A.D. Maternal Grandmother      Other Cancer Maternal Grandmother         Leukemia     Breast Cancer No family hx of      Cancer - colorectal No family hx of      Social History     Socioeconomic History     Marital status:      Spouse name: None     Number of children: None     Years of education: None     Highest education level: None   Occupational History     None   Social Needs  "    Financial resource strain: None     Food insecurity     Worry: None     Inability: None     Transportation needs     Medical: None     Non-medical: None   Tobacco Use     Smoking status: Former Smoker     Packs/day: 0.50     Years: 5.00     Pack years: 2.50     Smokeless tobacco: Never Used     Tobacco comment: 2001   Substance and Sexual Activity     Alcohol use: Yes     Comment: Once or twice a week     Drug use: No     Sexual activity: Yes     Partners: Male     Birth control/protection: Female Surgical     Comment: 2004 Vaginal Hysterectomy   Lifestyle     Physical activity     Days per week: None     Minutes per session: None     Stress: None   Relationships     Social connections     Talks on phone: None     Gets together: None     Attends Spiritism service: None     Active member of club or organization: None     Attends meetings of clubs or organizations: None     Relationship status: None     Intimate partner violence     Fear of current or ex partner: None     Emotionally abused: None     Physically abused: None     Forced sexual activity: None   Other Topics Concern     Parent/sibling w/ CABG, MI or angioplasty before 65F 55M? No   Social History Narrative     None       Current Outpatient Medications:      citalopram (CELEXA) 20 MG tablet, Take 1 tablet (20 mg) by mouth daily, Disp: 90 tablet, Rfl: 3     gabapentin (NEURONTIN) 300 MG capsule, Take 1 capsule (300 mg) by mouth 3 times daily, Disp: 90 capsule, Rfl: 11  Allergies   Allergen Reactions     Sulfa Drugs      hives       Past medical, surgical, social and family history were reviewed and updated in EPIC.    ROS:  12 point review of systems negative other than symptoms noted below.      EXAM:  /78   Ht 1.632 m (5' 4.25\")   Wt 69.4 kg (153 lb)   LMP 09/22/2004   BMI 26.06 kg/m     BMI: Body mass index is 26.06 kg/m .  Constitutional: healthy, alert and no distress  Head: Normocephalic. No masses, lesions, tenderness or " abnormalities  Neck: Neck supple. Trachea midline. No adenopathy. Thyroid symmetric, normal size.   Cardiovascular: RRR.   Respiratory: Negative.   Breast: No nodularity, asymmetry or nipple discharge bilaterally.  Gastrointestinal: Abdomen soft, non-tender, non-distended. No masses, organomegaly  Vulva:  No external lesions, normal female hair distribution, no inguinal adenopathy.    Urethra:  Midline, non-tender, well supported, no discharge  Vagina:  Atrophic, no abnormal discharge, no lesions.  Pap of vaginal apex obtained  Cervix: surgically absent  Uterus:  surgically absent  Ovaries:  No masses appreciated, non-tender, mobile  Rectal Exam: deferred  Musculoskeletal: extremities normal  Skin: no suspicious lesions or rashes  Psychiatric: Affect appropriate, cooperative, mentation appears normal.     COUNSELING:   Reviewed preventive health counseling, as reflected in patient instructions       Mammography   reports that she has quit smoking. She has a 2.50 pack-year smoking history. She has never used smokeless tobacco.      ASSESSMENT:  51 year old  with satisfactory annual exam  (N87.1) BELLE II (cervical intraepithelial neoplasia II)  (primary encounter diagnosis)  Comment: Pap today.  If neg she will >20ys out from hyst in 3 yrs so will not need further Paps  Plan: Pap imaged thin layer screen with HPV -         recommended age 30 - 65 years (select HPV order        below), HPV High Risk Types DNA Cervical            (Z12.31) Visit for screening mammogram  Comment:   Plan: MA Screening Digital Bilateral          Pelvic relaxation symptoms.  Urogyne referral       Rayshawn Bonilla MD

## 2021-07-01 LAB
COPATH REPORT: NORMAL
PAP: NORMAL

## 2021-07-05 LAB
FINAL DIAGNOSIS: NORMAL
HPV HR 12 DNA CVX QL NAA+PROBE: NEGATIVE
HPV16 DNA SPEC QL NAA+PROBE: NEGATIVE
HPV18 DNA SPEC QL NAA+PROBE: NEGATIVE
SPECIMEN DESCRIPTION: NORMAL
SPECIMEN SOURCE CVX/VAG CYTO: NORMAL

## 2021-09-12 ENCOUNTER — HEALTH MAINTENANCE LETTER (OUTPATIENT)
Age: 51
End: 2021-09-12

## 2022-01-28 ENCOUNTER — ALLIED HEALTH/NURSE VISIT (OUTPATIENT)
Dept: FAMILY MEDICINE | Facility: CLINIC | Age: 52
End: 2022-01-28
Payer: COMMERCIAL

## 2022-01-28 DIAGNOSIS — Z23 NEED FOR SHINGLES VACCINE: Primary | ICD-10-CM

## 2022-01-28 PROCEDURE — 90750 HZV VACC RECOMBINANT IM: CPT

## 2022-01-28 PROCEDURE — 90471 IMMUNIZATION ADMIN: CPT

## 2022-01-28 PROCEDURE — 99207 PR NO CHARGE NURSE ONLY: CPT

## 2022-02-17 PROBLEM — N87.1 CIN II (CERVICAL INTRAEPITHELIAL NEOPLASIA II): Status: ACTIVE | Noted: 2017-04-03

## 2022-04-05 ENCOUNTER — TELEPHONE (OUTPATIENT)
Dept: FAMILY MEDICINE | Facility: CLINIC | Age: 52
End: 2022-04-05
Payer: COMMERCIAL

## 2022-04-05 NOTE — TELEPHONE ENCOUNTER
Called the pt.      She thinks she has chronic inflammation through her body.  She says this has been going on for worse.  It has gotten pretty bad and she is having pain everyday.  All of her joints hurt and her abdomen can get very distended and big.  She says she can see the inflammation in her hands and her feet and her privates.  They look bigger - she says she can feel it in her breast.  This is getting worse and she is concerned.      Appt was scheduled.

## 2022-04-05 NOTE — TELEPHONE ENCOUNTER
Reason for Call:  Same Day Appointment, Requested Provider: Work-in for same day or Approval request appointment time slot    PCP: Tatiana Oseguera    Reason for visit: chronic inflammation all over body - Valeri would also like a full blood panel completed      Duration of symptoms: existed for many years but has gotten worse since the first of the year     Have you been treated for this in the past? No    Additional comments: Please call her back to schedule if anything is available soon. Only available appointments to central scheduling were not until July 2022.     Can we leave a detailed message on this number? YES    Phone number patient can be reached at: Cell number on file:    Telephone Information:   Mobile [phone]       Best Time: any time    Call taken on 4/5/2022 at 11:39 AM by Blanca Sheridan

## 2022-04-06 ENCOUNTER — OFFICE VISIT (OUTPATIENT)
Dept: FAMILY MEDICINE | Facility: CLINIC | Age: 52
End: 2022-04-06
Payer: COMMERCIAL

## 2022-04-06 VITALS
SYSTOLIC BLOOD PRESSURE: 146 MMHG | WEIGHT: 161.7 LBS | HEIGHT: 65 IN | HEART RATE: 61 BPM | OXYGEN SATURATION: 98 % | TEMPERATURE: 97.5 F | BODY MASS INDEX: 26.94 KG/M2 | DIASTOLIC BLOOD PRESSURE: 90 MMHG

## 2022-04-06 DIAGNOSIS — R20.2 TINGLING: Primary | ICD-10-CM

## 2022-04-06 DIAGNOSIS — E53.8 VITAMIN B 12 DEFICIENCY: ICD-10-CM

## 2022-04-06 DIAGNOSIS — F41.9 ANXIETY: ICD-10-CM

## 2022-04-06 DIAGNOSIS — R53.83 FATIGUE, UNSPECIFIED TYPE: ICD-10-CM

## 2022-04-06 DIAGNOSIS — R79.89 ELEVATED TSH: ICD-10-CM

## 2022-04-06 DIAGNOSIS — Z11.59 NEED FOR HEPATITIS C SCREENING TEST: ICD-10-CM

## 2022-04-06 DIAGNOSIS — M54.2 CERVICALGIA: ICD-10-CM

## 2022-04-06 DIAGNOSIS — E03.9 HYPOTHYROIDISM, UNSPECIFIED TYPE: ICD-10-CM

## 2022-04-06 DIAGNOSIS — Z12.11 SCREEN FOR COLON CANCER: ICD-10-CM

## 2022-04-06 DIAGNOSIS — M50.30 DDD (DEGENERATIVE DISC DISEASE), CERVICAL: ICD-10-CM

## 2022-04-06 DIAGNOSIS — R52 GENERALIZED BODY ACHES: ICD-10-CM

## 2022-04-06 LAB
CRP SERPL-MCNC: <2.9 MG/L (ref 0–8)
ERYTHROCYTE [DISTWIDTH] IN BLOOD BY AUTOMATED COUNT: 12.7 % (ref 10–15)
ERYTHROCYTE [SEDIMENTATION RATE] IN BLOOD BY WESTERGREN METHOD: 8 MM/HR (ref 0–30)
HCT VFR BLD AUTO: 44.2 % (ref 35–47)
HGB BLD-MCNC: 14.7 G/DL (ref 11.7–15.7)
MCH RBC QN AUTO: 30.8 PG (ref 26.5–33)
MCHC RBC AUTO-ENTMCNC: 33.3 G/DL (ref 31.5–36.5)
MCV RBC AUTO: 93 FL (ref 78–100)
PLATELET # BLD AUTO: 237 10E3/UL (ref 150–450)
RBC # BLD AUTO: 4.78 10E6/UL (ref 3.8–5.2)
VIT B12 SERPL-MCNC: 147 PG/ML (ref 193–986)
WBC # BLD AUTO: 8 10E3/UL (ref 4–11)

## 2022-04-06 PROCEDURE — 85652 RBC SED RATE AUTOMATED: CPT | Performed by: NURSE PRACTITIONER

## 2022-04-06 PROCEDURE — 36415 COLL VENOUS BLD VENIPUNCTURE: CPT | Performed by: NURSE PRACTITIONER

## 2022-04-06 PROCEDURE — 86364 TISS TRNSGLTMNASE EA IG CLAS: CPT | Performed by: NURSE PRACTITIONER

## 2022-04-06 PROCEDURE — 84439 ASSAY OF FREE THYROXINE: CPT | Performed by: NURSE PRACTITIONER

## 2022-04-06 PROCEDURE — 80053 COMPREHEN METABOLIC PANEL: CPT | Performed by: NURSE PRACTITIONER

## 2022-04-06 PROCEDURE — 82607 VITAMIN B-12: CPT | Performed by: NURSE PRACTITIONER

## 2022-04-06 PROCEDURE — 84443 ASSAY THYROID STIM HORMONE: CPT | Performed by: NURSE PRACTITIONER

## 2022-04-06 PROCEDURE — 86038 ANTINUCLEAR ANTIBODIES: CPT | Performed by: NURSE PRACTITIONER

## 2022-04-06 PROCEDURE — 86140 C-REACTIVE PROTEIN: CPT | Performed by: NURSE PRACTITIONER

## 2022-04-06 PROCEDURE — 86803 HEPATITIS C AB TEST: CPT | Performed by: NURSE PRACTITIONER

## 2022-04-06 PROCEDURE — 86376 MICROSOMAL ANTIBODY EACH: CPT | Performed by: NURSE PRACTITIONER

## 2022-04-06 PROCEDURE — 86200 CCP ANTIBODY: CPT | Performed by: NURSE PRACTITIONER

## 2022-04-06 PROCEDURE — 85027 COMPLETE CBC AUTOMATED: CPT | Performed by: NURSE PRACTITIONER

## 2022-04-06 PROCEDURE — 86431 RHEUMATOID FACTOR QUANT: CPT | Performed by: NURSE PRACTITIONER

## 2022-04-06 PROCEDURE — 99214 OFFICE O/P EST MOD 30 MIN: CPT | Performed by: NURSE PRACTITIONER

## 2022-04-06 RX ORDER — CITALOPRAM HYDROBROMIDE 20 MG/1
20 TABLET ORAL DAILY
Qty: 90 TABLET | Refills: 3 | Status: SHIPPED | OUTPATIENT
Start: 2022-04-06 | End: 2023-04-11

## 2022-04-06 RX ORDER — GABAPENTIN 300 MG/1
300 CAPSULE ORAL 3 TIMES DAILY
Qty: 90 CAPSULE | Refills: 11 | Status: SHIPPED | OUTPATIENT
Start: 2022-04-06 | End: 2022-12-21

## 2022-04-06 ASSESSMENT — PATIENT HEALTH QUESTIONNAIRE - PHQ9
SUM OF ALL RESPONSES TO PHQ QUESTIONS 1-9: 11
SUM OF ALL RESPONSES TO PHQ QUESTIONS 1-9: 11
10. IF YOU CHECKED OFF ANY PROBLEMS, HOW DIFFICULT HAVE THESE PROBLEMS MADE IT FOR YOU TO DO YOUR WORK, TAKE CARE OF THINGS AT HOME, OR GET ALONG WITH OTHER PEOPLE: VERY DIFFICULT

## 2022-04-06 ASSESSMENT — PAIN SCALES - GENERAL: PAINLEVEL: MODERATE PAIN (5)

## 2022-04-06 NOTE — PROGRESS NOTES
Assessment & Plan     Cervicalgia  Finds neurontin helpful; refilling. Was having frequent muscle spasms before starting on medication; now much improved.   - gabapentin (NEURONTIN) 300 MG capsule; Take 1 capsule (300 mg) by mouth 3 times daily    DDD (degenerative disc disease), cervical  Medication is helpful; refilling.    - gabapentin (NEURONTIN) 300 MG capsule; Take 1 capsule (300 mg) by mouth 3 times daily    Anxiety  Controlled; continue ssri.   - citalopram (CELEXA) 20 MG tablet; Take 1 tablet (20 mg) by mouth daily    Screen for colon cancer  Plans to do this summer.     Need for hepatitis C screening test  Checking with lab today.   - Hepatitis C Screen Reflex to HCV RNA Quant and Genotype; Future  - Hepatitis C Screen Reflex to HCV RNA Quant and Genotype    Tingling  - CRP, inflammation; Future  - ESR: Erythrocyte sedimentation rate; Future  - Vitamin B12; Future  - CRP, inflammation  - ESR: Erythrocyte sedimentation rate  - Vitamin B12    Fatigue, unspecified type  - Comprehensive metabolic panel (BMP + Alb, Alk Phos, ALT, AST, Total. Bili, TP); Future  - CBC with platelets; Future  - TSH with free T4 reflex; Future  - Tissue transglutaminase johann IgA and IgG; Future  - Comprehensive metabolic panel (BMP + Alb, Alk Phos, ALT, AST, Total. Bili, TP)  - CBC with platelets  - TSH with free T4 reflex  - Tissue transglutaminase johann IgA and IgG    Generalized body aches  - Comprehensive metabolic panel (BMP + Alb, Alk Phos, ALT, AST, Total. Bili, TP); Future  - CBC with platelets; Future  - CRP, inflammation; Future  - ESR: Erythrocyte sedimentation rate; Future  - Rheumatoid factor; Future  - Anti Nuclear Johann IgG by IFA with Reflex; Future  - Cyclic Citrullinated Peptide Antibody IgG; Future  - Comprehensive metabolic panel (BMP + Alb, Alk Phos, ALT, AST, Total. Bili, TP)  - CBC with platelets  - CRP, inflammation  - ESR: Erythrocyte sedimentation rate  - Rheumatoid factor  - Anti Nuclear Johann IgG by IFA with  Reflex  - Cyclic Citrullinated Peptide Antibody IgG    Valeri is here today with ongoing symptoms for past several years notable for pain from head to toe, tingling over much of her body, fatigue, and headaches.  It is unknown what is causing her symptoms today.  Broad lab work up initiated.  Follow-up based on results.  Discussed if all of her results return normal she should follow-up with her PCP to discuss further.           Return in about 2 months (around 6/6/2022) for follow up with PCP to discuss pain, tingling, fatigue .    SIMRAN Mccartney [facility]    Rehana Trammell is a 52 year old who presents for the following health issues     History of Present Illness       Mental Health Follow-up:                    Today's PHQ-9         PHQ-9 Total Score: 11  PHQ-9 Q9 Thoughts of better off dead/self-harm past 2 weeks :   (P) Not at all    How difficult have these problems made it for you to do your work, take care of things at home, or get along with other people: Very difficult        Reason for visit:  Worried about a possible auto immune disease  Symptom onset:  More than a month  Symptoms include:  Chronic fatigue headaches joint pain distended abdomen constipation inflammation tingling in hands and feet  Symptom intensity:  Severe  Symptom progression:  Worsening  Had these symptoms before:  Yes  Has tried/received treatment for these symptoms:  Yes  Previous treatment was successful:  No  What makes it better:  Ibuprofen    She eats 2-3 servings of fruits and vegetables daily.She consumes 0 sweetened beverage(s) daily.She exercises with enough effort to increase her heart rate 9 or less minutes per day.  She exercises with enough effort to increase her heart rate 3 or less days per week.   She is taking medications regularly.      She saw a neurologist 10 years ago to try and get help with her symptoms.   Also saw chiro and PT.   Symptoms have been ongoing.   Has  "pain all over her body.   Feels pain in all of her joints from head to toe.   Has been having headaches more days than not.   Neck, upper back and hips hurt.   Hands and feet can look swollen.   Sometimes has numbness and tingling in the fingers and toes.    Known hx of cervical DDD.    Hasn't had any recent imaging of the neck.   Hx of running constipated for since she was a child.   Always feels cold.   No rashes.   S/p hysterectomy.   Really dry skin, especially over the legs.   No dry mouth.   Never had covid infection.   No recent illness.     Has been feeling down about all of the symptoms she is having.  Also triggering some anxiety for her.  Works at a school and with covid added anxiety.    ALIYA-7 SCORE 9/11/2018 1/20/2020 3/29/2021   Total Score - - -   Total Score - - -   Total Score 4 7 2     PHQ 1/20/2020 3/29/2021 4/6/2022   PHQ-9 Total Score 6 3 11   Q9: Thoughts of better off dead/self-harm past 2 weeks Not at all Not at all Not at all             Review of Systems   Constitutional, HEENT, cardiovascular, pulmonary, gi and gu systems are negative, except as otherwise noted.      Objective    BP (!) 146/90 (BP Location: Right arm, Patient Position: Sitting, Cuff Size: Adult Regular)   Pulse 61   Temp 97.5  F (36.4  C) (Oral)   Ht 1.638 m (5' 4.5\")   Wt 73.3 kg (161 lb 11.2 oz)   LMP 09/22/2004   SpO2 98%   BMI 27.33 kg/m    Body mass index is 27.33 kg/m .  Physical Exam   GENERAL: healthy, alert and no distress  EYES: Eyes grossly normal to inspection and conjunctivae and sclerae normal  HENT: ear canals and TM's normal, nose and mouth without ulcers or lesions  NECK: no adenopathy, no asymmetry, masses, or scars and thyroid normal to palpation  RESP: lungs clear to auscultation - no rales, rhonchi or wheezes  CV: regular rate and rhythm, normal S1 S2, no S3 or S4, no murmur, click or rub, no peripheral edema  ABDOMEN: soft, nontender, no hepatosplenomegaly, no masses and bowel sounds " normal  MS: no gross musculoskeletal defects noted, no edema  SKIN: no suspicious lesions or rashes  NEURO: Normal strength and tone, mentation intact and speech normal  PSYCH: mentation appears normal, affect normal/bright    Results for orders placed or performed in visit on 04/06/22 (from the past 24 hour(s))   ESR: Erythrocyte sedimentation rate   Result Value Ref Range    Erythrocyte Sedimentation Rate 8 0 - 30 mm/hr   CBC with platelets   Result Value Ref Range    WBC Count 8.0 4.0 - 11.0 10e3/uL    RBC Count 4.78 3.80 - 5.20 10e6/uL    Hemoglobin 14.7 11.7 - 15.7 g/dL    Hematocrit 44.2 35.0 - 47.0 %    MCV 93 78 - 100 fL    MCH 30.8 26.5 - 33.0 pg    MCHC 33.3 31.5 - 36.5 g/dL    RDW 12.7 10.0 - 15.0 %    Platelet Count 237 150 - 450 10e3/uL

## 2022-04-06 NOTE — NURSING NOTE
"Chief Complaint   Patient presents with     Joint Pain     Initial BP (!) 146/90 (BP Location: Right arm, Patient Position: Sitting, Cuff Size: Adult Regular)   Pulse 61   Temp 97.5  F (36.4  C) (Oral)   Ht 1.638 m (5' 4.5\")   Wt 73.3 kg (161 lb 11.2 oz)   LMP 09/22/2004   SpO2 98%   BMI 27.33 kg/m   Estimated body mass index is 27.33 kg/m  as calculated from the following:    Height as of this encounter: 1.638 m (5' 4.5\").    Weight as of this encounter: 73.3 kg (161 lb 11.2 oz).  BP completed using cuff size regular long right arm    Lisa Magill, CMA    "

## 2022-04-07 LAB
ALBUMIN SERPL-MCNC: 3.7 G/DL (ref 3.4–5)
ALP SERPL-CCNC: 74 U/L (ref 40–150)
ALT SERPL W P-5'-P-CCNC: 32 U/L (ref 0–50)
ANA SER QL IF: NEGATIVE
ANION GAP SERPL CALCULATED.3IONS-SCNC: 7 MMOL/L (ref 3–14)
AST SERPL W P-5'-P-CCNC: 24 U/L (ref 0–45)
BILIRUB SERPL-MCNC: 0.6 MG/DL (ref 0.2–1.3)
BUN SERPL-MCNC: 22 MG/DL (ref 7–30)
CALCIUM SERPL-MCNC: 10.1 MG/DL (ref 8.5–10.1)
CHLORIDE BLD-SCNC: 105 MMOL/L (ref 94–109)
CO2 SERPL-SCNC: 24 MMOL/L (ref 20–32)
CREAT SERPL-MCNC: 0.92 MG/DL (ref 0.52–1.04)
GFR SERPL CREATININE-BSD FRML MDRD: 74 ML/MIN/1.73M2
GLUCOSE BLD-MCNC: 98 MG/DL (ref 70–99)
HCV AB SERPL QL IA: NONREACTIVE
POTASSIUM BLD-SCNC: 4.4 MMOL/L (ref 3.4–5.3)
PROT SERPL-MCNC: 7.6 G/DL (ref 6.8–8.8)
RHEUMATOID FACT SER NEPH-ACNC: <6 IU/ML
SODIUM SERPL-SCNC: 136 MMOL/L (ref 133–144)
T4 FREE SERPL-MCNC: 0.72 NG/DL (ref 0.76–1.46)
TSH SERPL DL<=0.005 MIU/L-ACNC: 7.78 MU/L (ref 0.4–4)

## 2022-04-07 ASSESSMENT — PATIENT HEALTH QUESTIONNAIRE - PHQ9: SUM OF ALL RESPONSES TO PHQ QUESTIONS 1-9: 11

## 2022-04-08 RX ORDER — LEVOTHYROXINE SODIUM 100 UG/1
100 TABLET ORAL DAILY
Qty: 30 TABLET | Refills: 2 | Status: SHIPPED | OUTPATIENT
Start: 2022-04-08 | End: 2022-06-13

## 2022-04-08 RX ORDER — CYANOCOBALAMIN 1000 UG/ML
1000 INJECTION, SOLUTION INTRAMUSCULAR; SUBCUTANEOUS WEEKLY
Status: COMPLETED | OUTPATIENT
Start: 2022-04-08 | End: 2022-05-05

## 2022-04-08 NOTE — RESULT ENCOUNTER NOTE
Patient has seen message. Concerned with taking thyroid medication. Would like to do lifestyle changes and diet. Patient stated would send provider mychart message concerning this.    Kylie Mcleod RN

## 2022-04-09 LAB
CCP AB SER IA-ACNC: 1.8 U/ML
TTG IGA SER-ACNC: 2 U/ML
TTG IGG SER-ACNC: 1 U/ML

## 2022-04-11 LAB — THYROPEROXIDASE AB SERPL-ACNC: >5000 IU/ML

## 2022-04-14 ENCOUNTER — ALLIED HEALTH/NURSE VISIT (OUTPATIENT)
Dept: FAMILY MEDICINE | Facility: CLINIC | Age: 52
End: 2022-04-14
Payer: COMMERCIAL

## 2022-04-14 DIAGNOSIS — E53.8 VITAMIN B 12 DEFICIENCY: Primary | ICD-10-CM

## 2022-04-14 PROCEDURE — 96372 THER/PROPH/DIAG INJ SC/IM: CPT | Performed by: NURSE PRACTITIONER

## 2022-04-14 PROCEDURE — 99207 PR NO CHARGE NURSE ONLY: CPT

## 2022-04-14 RX ADMIN — CYANOCOBALAMIN 1000 MCG: 1000 INJECTION, SOLUTION INTRAMUSCULAR; SUBCUTANEOUS at 16:05

## 2022-04-14 NOTE — PROGRESS NOTES
Clinic Administered Medication Documentation    Administrations This Visit     cyanocobalamin injection 1,000 mcg     Admin Date  04/14/2022 Action  Given Dose  1,000 mcg Route  Intramuscular Site  Right Deltoid Administered By  Shayla Aquino MA    Ordering Provider: Bridget Gay APRN CNP    Patient Supplied?: No                  Injectable Medication Documentation    Patient was given Cyanocobalamin (B-12). Prior to medication administration, verified patients identity using patient s name and date of birth. Please see MAR and medication order for additional information. Patient instructed to remain in clinic for 15 minutes.      Was entire vial of medication used? Yes  Vial/Syringe: Single dose vial  Expiration Date:  11/30/23  Was this medication supplied by the patient? No

## 2022-04-21 ENCOUNTER — ALLIED HEALTH/NURSE VISIT (OUTPATIENT)
Dept: FAMILY MEDICINE | Facility: CLINIC | Age: 52
End: 2022-04-21
Payer: COMMERCIAL

## 2022-04-21 DIAGNOSIS — E53.8 VITAMIN B12 DEFICIENCY (NON ANEMIC): Primary | ICD-10-CM

## 2022-04-21 PROCEDURE — 96372 THER/PROPH/DIAG INJ SC/IM: CPT | Performed by: NURSE PRACTITIONER

## 2022-04-21 PROCEDURE — 99207 PR NO CHARGE NURSE ONLY: CPT

## 2022-04-21 RX ADMIN — CYANOCOBALAMIN 1000 MCG: 1000 INJECTION, SOLUTION INTRAMUSCULAR; SUBCUTANEOUS at 15:36

## 2022-04-21 NOTE — PROGRESS NOTES
Clinic Administered Medication Documentation      Injectable Medication Documentation    Patient was given Cyanocobalamin (B-12). Prior to medication administration, verified patients identity using patient s name and date of birth. Please see MAR and medication order for additional information. Patient instructed to remain in clinic for 15 minutes.      Was entire vial of medication used? Yes  Vial/Syringe: Single dose vial  Expiration Date:  11/30/23  Was this medication supplied by the patient? No     Georgette Marshall MA

## 2022-04-22 ENCOUNTER — MYC MEDICAL ADVICE (OUTPATIENT)
Dept: FAMILY MEDICINE | Facility: CLINIC | Age: 52
End: 2022-04-22
Payer: COMMERCIAL

## 2022-04-23 ENCOUNTER — HEALTH MAINTENANCE LETTER (OUTPATIENT)
Age: 52
End: 2022-04-23

## 2022-04-28 ENCOUNTER — ALLIED HEALTH/NURSE VISIT (OUTPATIENT)
Dept: FAMILY MEDICINE | Facility: CLINIC | Age: 52
End: 2022-04-28
Payer: COMMERCIAL

## 2022-04-28 DIAGNOSIS — E53.8 VITAMIN B12 DEFICIENCY (NON ANEMIC): Primary | ICD-10-CM

## 2022-04-28 PROCEDURE — 96372 THER/PROPH/DIAG INJ SC/IM: CPT | Performed by: NURSE PRACTITIONER

## 2022-04-28 PROCEDURE — 99207 PR NO CHARGE NURSE ONLY: CPT

## 2022-04-28 RX ADMIN — CYANOCOBALAMIN 1000 MCG: 1000 INJECTION, SOLUTION INTRAMUSCULAR; SUBCUTANEOUS at 15:48

## 2022-04-28 NOTE — PROGRESS NOTES
Clinic Administered Medication Documentation    Administrations This Visit     cyanocobalamin injection 1,000 mcg     Admin Date  04/28/2022 Action  Given Dose  1,000 mcg Route  Intramuscular Site  Left Deltoid Administered By  Georgette Marshall    Ordering Provider: Bridget Gay APRN CNP    Patient Supplied?: No                  Injectable Medication Documentation    Patient was given Cyanocobalamin (B-12). Prior to medication administration, verified patients identity using patient s name and date of birth. Please see MAR and medication order for additional information. Patient instructed to remain in clinic for 15 minutes.      Was entire vial of medication used? Yes  Vial/Syringe: Single dose vial  Expiration Date:  12/31/22  Was this medication supplied by the patient? No     Georgette Marshall MA

## 2022-05-03 DIAGNOSIS — E03.9 HYPOTHYROIDISM, UNSPECIFIED TYPE: ICD-10-CM

## 2022-05-04 ENCOUNTER — TELEPHONE (OUTPATIENT)
Dept: FAMILY MEDICINE | Facility: CLINIC | Age: 52
End: 2022-05-04
Payer: COMMERCIAL

## 2022-05-04 DIAGNOSIS — E03.9 HYPOTHYROIDISM, UNSPECIFIED TYPE: ICD-10-CM

## 2022-05-04 DIAGNOSIS — E53.8 VITAMIN B 12 DEFICIENCY: Primary | ICD-10-CM

## 2022-05-04 RX ORDER — LEVOTHYROXINE SODIUM 100 UG/1
TABLET ORAL
Qty: 30 TABLET | Refills: 2 | OUTPATIENT
Start: 2022-05-04

## 2022-05-04 NOTE — TELEPHONE ENCOUNTER
Ordered B12 level.  No reason to recheck thyroid since this is not being treated (she elected to wait to see endo prior to starting treatment).     Bridget Gay CNP

## 2022-05-04 NOTE — TELEPHONE ENCOUNTER
Patient has video visit scheduled 5/23 with Becki. Concerned there are no lab orders for thyroid and B12?     Does have appointment with Endo 7/26.    Advised that message would be sent to [place] for lab orders if needed and someone would get in touch to schedule lab only.    Please place follow up lab orders if needed. Route to  to help patient schedule lab only.    Kylie Mcleod RN

## 2022-05-04 NOTE — TELEPHONE ENCOUNTER
Pt feels like she is not being told anything. She is frustrated.     She is having her fourth Vit B12 shot Thurs. When is she suppose to recheck her level? Will it tell us if she has to continue?     She has been on Levo 100 mcg for approx 3 weeks. So she should recheck at 6-8 wks, correct? She reports she has not missed a dose.     Pt has a f/u with you at the end of May- is that appropriate. She again is feeling like she doesn't really know what is going on? She said if you don't need to see her then that is fine.     Need to schedule lab when?     Sorry for all the questions. Just trying to clear up the confusion.     Anita LOPEZ RN

## 2022-05-05 ENCOUNTER — ALLIED HEALTH/NURSE VISIT (OUTPATIENT)
Dept: FAMILY MEDICINE | Facility: CLINIC | Age: 52
End: 2022-05-05
Payer: COMMERCIAL

## 2022-05-05 DIAGNOSIS — E53.8 VITAMIN B 12 DEFICIENCY: Primary | ICD-10-CM

## 2022-05-05 PROCEDURE — 99207 PR NO CHARGE NURSE ONLY: CPT

## 2022-05-05 PROCEDURE — 96372 THER/PROPH/DIAG INJ SC/IM: CPT | Performed by: NURSE PRACTITIONER

## 2022-05-05 RX ADMIN — CYANOCOBALAMIN 1000 MCG: 1000 INJECTION, SOLUTION INTRAMUSCULAR; SUBCUTANEOUS at 15:36

## 2022-05-05 NOTE — PROGRESS NOTES
Clinic Administered Medication Documentation    Administrations This Visit     cyanocobalamin injection 1,000 mcg     Admin Date  05/05/2022 Action  Given Dose  1,000 mcg Route  Intramuscular Site  Left Deltoid Administered By  Shayla Aquino MA    Ordering Provider: Bridget Gay APRN CNP    Patient Supplied?: No                  Injectable Medication Documentation    Patient was given Cyanocobalamin (B-12). Prior to medication administration, verified patients identity using patient s name and date of birth. Please see MAR and medication order for additional information. Patient instructed to remain in clinic for 15 minutes.      Was entire vial of medication used? Yes  Vial/Syringe: Single dose vial  Expiration Date:  12/31/2022  Was this medication supplied by the patient? No

## 2022-05-22 ASSESSMENT — PATIENT HEALTH QUESTIONNAIRE - PHQ9
10. IF YOU CHECKED OFF ANY PROBLEMS, HOW DIFFICULT HAVE THESE PROBLEMS MADE IT FOR YOU TO DO YOUR WORK, TAKE CARE OF THINGS AT HOME, OR GET ALONG WITH OTHER PEOPLE: NOT DIFFICULT AT ALL
SUM OF ALL RESPONSES TO PHQ QUESTIONS 1-9: 4
SUM OF ALL RESPONSES TO PHQ QUESTIONS 1-9: 4

## 2022-05-23 ENCOUNTER — VIRTUAL VISIT (OUTPATIENT)
Dept: FAMILY MEDICINE | Facility: CLINIC | Age: 52
End: 2022-05-23
Payer: COMMERCIAL

## 2022-05-23 DIAGNOSIS — E53.8 VITAMIN B12 DEFICIENCY (NON ANEMIC): Primary | ICD-10-CM

## 2022-05-23 DIAGNOSIS — E06.3 HASHIMOTO'S THYROIDITIS: ICD-10-CM

## 2022-05-23 PROCEDURE — 99213 OFFICE O/P EST LOW 20 MIN: CPT | Mod: GT | Performed by: NURSE PRACTITIONER

## 2022-05-23 RX ORDER — CYANOCOBALAMIN 1000 UG/ML
1000 INJECTION, SOLUTION INTRAMUSCULAR; SUBCUTANEOUS
Status: ACTIVE | OUTPATIENT
Start: 2022-05-23 | End: 2022-07-22

## 2022-05-23 ASSESSMENT — PATIENT HEALTH QUESTIONNAIRE - PHQ9
SUM OF ALL RESPONSES TO PHQ QUESTIONS 1-9: 4
10. IF YOU CHECKED OFF ANY PROBLEMS, HOW DIFFICULT HAVE THESE PROBLEMS MADE IT FOR YOU TO DO YOUR WORK, TAKE CARE OF THINGS AT HOME, OR GET ALONG WITH OTHER PEOPLE: NOT DIFFICULT AT ALL

## 2022-05-23 NOTE — PROGRESS NOTES
Valeri is a 52 year old who is being evaluated via a billable video visit.      How would you like to obtain your AVS? MyChart  If the video visit is dropped, the invitation should be resent by: Text to cell phone: [phone]  Will anyone else be joining your video visit? No      Video Start Time: 1:09 pm    Assessment & Plan       Vitamin B12 deficiency (non anemic)  Neurological symptoms improving; will have her move from weekly to monthly injections.  Will recheck level with lab.  Will follow-up with endocrinology in July.   - cyanocobalamin injection 1,000 mcg    Hashimoto's thyroiditis  Symptoms have improved quite a bit.  Due to have labs rechecked.  Medications changes will be made based on lab results.  Will follow-up with endocrinology in July.             No follow-ups on file.    SIMRAN Mccartney [facility] [place]    Subjective   Valeri is a 52 year old who presents for the following health issues     History of Present Illness       Hypothyroidism:     Since last visit, patient describes the following symptoms::  Constipation and Fatigue    She eats 4 or more servings of fruits and vegetables daily.She consumes 0 sweetened beverage(s) daily.She exercises with enough effort to increase her heart rate 9 or less minutes per day.  She exercises with enough effort to increase her heart rate 3 or less days per week.   She is taking medications regularly.    Today's PHQ-9         PHQ-9 Total Score: 4    PHQ-9 Q9 Thoughts of better off dead/self-harm past 2 weeks :   Not at all    How difficult have these problems made it for you to do your work, take care of things at home, or get along with other people: Not difficult at all   Diagnosed with hypothyroidism in April.  Due to have labs rechecked.   At the time B12 was also low and she did weekly B12 injections for 4 weeks.   Numbness and tinging has improved a lot, but not fully resolved.   Joint discomfort has improved.  Has cut  out sugar from her diet.   Feels like she has more engery, able to do more things.   Continues to have constipation (chronic since childhood).  Skin is less dry, not scratching as much.   Feels like she needs to wear extra clothing due to feeling cold, but not wrapped up in several layers.           Review of Systems   Constitutional, HEENT, cardiovascular, pulmonary, gi and gu systems are negative, except as otherwise noted.      Objective    Vitals - Patient Reported  Weight (Patient Reported): 68.5 kg (151 lb)  Pain Score: No Pain (0)      Vitals:  No vitals were obtained today due to virtual visit.    Physical Exam   GENERAL: Healthy, alert and no distress  EYES: Eyes grossly normal to inspection.  No discharge or erythema, or obvious scleral/conjunctival abnormalities.  RESP: No audible wheeze, cough, or visible cyanosis.  No increased work of breathing.    SKIN: Visible skin clear. No significant rash, abnormal pigmentation or lesions.  NEURO: Cranial nerves grossly intact.  Mentation and speech appropriate for age.  PSYCH: Mentation appears normal, affect normal/bright, judgement and insight intact, normal speech and appearance well-groomed.    No results found for this or any previous visit (from the past 24 hour(s)).            Video-Visit Details    Type of service:  Video Visit    Video End Time:1:17 PM    Originating Location (pt. Location): Other work    Distant Location (provider location):  [facility] [employer]     Platform used for Video Visit: [employer]

## 2022-05-31 DIAGNOSIS — E03.9 HYPOTHYROIDISM, UNSPECIFIED TYPE: ICD-10-CM

## 2022-06-02 NOTE — TELEPHONE ENCOUNTER
Routing refill request to provider for review/approval because:  Labs out of range:  TSH    TSH   Date Value Ref Range Status   04/06/2022 7.78 (H) 0.40 - 4.00 mU/L Final   11/11/2015 4.180 mcU/mL Antwan LEUNG RN

## 2022-06-09 ENCOUNTER — ALLIED HEALTH/NURSE VISIT (OUTPATIENT)
Dept: FAMILY MEDICINE | Facility: CLINIC | Age: 52
End: 2022-06-09

## 2022-06-09 ENCOUNTER — LAB (OUTPATIENT)
Dept: LAB | Facility: CLINIC | Age: 52
End: 2022-06-09
Payer: COMMERCIAL

## 2022-06-09 DIAGNOSIS — E53.8 VITAMIN B12 DEFICIENCY (NON ANEMIC): Primary | ICD-10-CM

## 2022-06-09 DIAGNOSIS — E53.8 VITAMIN B 12 DEFICIENCY: ICD-10-CM

## 2022-06-09 DIAGNOSIS — E03.9 HYPOTHYROIDISM, UNSPECIFIED TYPE: ICD-10-CM

## 2022-06-09 PROCEDURE — 96372 THER/PROPH/DIAG INJ SC/IM: CPT | Performed by: NURSE PRACTITIONER

## 2022-06-09 PROCEDURE — 99207 PR NO CHARGE NURSE ONLY: CPT

## 2022-06-09 PROCEDURE — 84439 ASSAY OF FREE THYROXINE: CPT

## 2022-06-09 PROCEDURE — 36415 COLL VENOUS BLD VENIPUNCTURE: CPT

## 2022-06-09 PROCEDURE — 84443 ASSAY THYROID STIM HORMONE: CPT

## 2022-06-09 PROCEDURE — 82607 VITAMIN B-12: CPT

## 2022-06-09 RX ADMIN — CYANOCOBALAMIN 1000 MCG: 1000 INJECTION, SOLUTION INTRAMUSCULAR; SUBCUTANEOUS at 15:36

## 2022-06-09 NOTE — PROGRESS NOTES
Clinic Administered Medication Documentation    Administrations This Visit     cyanocobalamin injection 1,000 mcg     Admin Date  06/09/2022 Action  Given Dose  1,000 mcg Route  Intramuscular Site  Left Deltoid Administered By  Georgette Marshall    Ordering Provider: Bridget Gay APRN CNP    NDC: 88164-205-74    Lot#: [id number]    : [employer] [facility]    Patient Supplied?: No                  Injectable Medication Documentation    Patient was given Cyanocobalamin (B-12). Prior to medication administration, verified patients identity using patient s name and date of birth. Please see MAR and medication order for additional information. Patient instructed to stay in clinic after the injection but patient declined.      Was entire vial of medication used? Yes  Vial/Syringe: Single dose vial  Expiration Date:  12/31/22  Was this medication supplied by the patient? No     Georgette Marshall MA

## 2022-06-10 LAB
T4 FREE SERPL-MCNC: 1.12 NG/DL (ref 0.76–1.46)
TSH SERPL DL<=0.005 MIU/L-ACNC: 0.23 MU/L (ref 0.4–4)
VIT B12 SERPL-MCNC: 198 PG/ML (ref 193–986)

## 2022-06-12 NOTE — TELEPHONE ENCOUNTER
Pt saw Bridget KHALIL on several occasions, including for thyroid and will be seeing Endocrine; Ana Rosa listed as PCP but have not seen in close to 15 months.   Will refer back to provider who saw pt.   Thanks.

## 2022-06-13 RX ORDER — LEVOTHYROXINE SODIUM 100 UG/1
TABLET ORAL
Qty: 30 TABLET | Refills: 2 | Status: SHIPPED | OUTPATIENT
Start: 2022-06-13 | End: 2022-07-26

## 2022-07-26 ENCOUNTER — VIRTUAL VISIT (OUTPATIENT)
Dept: ENDOCRINOLOGY | Facility: CLINIC | Age: 52
End: 2022-07-26
Attending: NURSE PRACTITIONER
Payer: COMMERCIAL

## 2022-07-26 DIAGNOSIS — E06.3 HASHIMOTO'S THYROIDITIS: ICD-10-CM

## 2022-07-26 DIAGNOSIS — E03.9 HYPOTHYROIDISM, UNSPECIFIED TYPE: ICD-10-CM

## 2022-07-26 DIAGNOSIS — R76.8 ANTI-TPO ANTIBODIES PRESENT: Primary | ICD-10-CM

## 2022-07-26 PROCEDURE — 99204 OFFICE O/P NEW MOD 45 MIN: CPT | Mod: GT | Performed by: INTERNAL MEDICINE

## 2022-07-26 RX ORDER — LEVOTHYROXINE SODIUM 88 UG/1
88 TABLET ORAL DAILY
Qty: 90 TABLET | Refills: 1 | Status: SHIPPED | OUTPATIENT
Start: 2022-07-26 | End: 2022-12-13

## 2022-07-26 ASSESSMENT — ENCOUNTER SYMPTOMS
TINGLING: 1
LOSS OF CONSCIOUSNESS: 0
HEARTBURN: 0
SHORTNESS OF BREATH: 0
JAUNDICE: 0
INCREASED ENERGY: 0
COUGH: 0
HEMOPTYSIS: 0
PARALYSIS: 0
ALTERED TEMPERATURE REGULATION: 1
DIZZINESS: 0
NERVOUS/ANXIOUS: 0
JOINT SWELLING: 0
POSTURAL DYSPNEA: 0
DEPRESSION: 0
COUGH DISTURBING SLEEP: 0
TREMORS: 0
PANIC: 0
DECREASED APPETITE: 0
FATIGUE: 1
BACK PAIN: 1
MUSCLE CRAMPS: 1
HALLUCINATIONS: 0
SPUTUM PRODUCTION: 0
WEIGHT LOSS: 0
MYALGIAS: 1
WEAKNESS: 0
MEMORY LOSS: 0
NIGHT SWEATS: 0
POLYDIPSIA: 0
DYSPNEA ON EXERTION: 0
INSOMNIA: 1
MUSCLE WEAKNESS: 0
BLOOD IN STOOL: 0
STIFFNESS: 1
NUMBNESS: 1
DISTURBANCES IN COORDINATION: 0
WHEEZING: 0
BLOATING: 1
DIARRHEA: 1
CONSTIPATION: 1
SNORES LOUDLY: 1
POLYPHAGIA: 0
RECTAL PAIN: 0
HEADACHES: 1
ARTHRALGIAS: 1
SPEECH CHANGE: 0
BOWEL INCONTINENCE: 0
ABDOMINAL PAIN: 1
VOMITING: 0
WEIGHT GAIN: 0
NAUSEA: 0
DECREASED CONCENTRATION: 0
CHILLS: 0
FEVER: 0
NECK PAIN: 1
SEIZURES: 0

## 2022-07-26 NOTE — PROGRESS NOTES
Valeri Wise  is being evaluated via a billable video visit.      How would you like to obtain your AVS? [employer]  For the video visit, send the invitation by: Text to cell phone: [phone]   Will anyone else be joining your video visit? No

## 2022-07-26 NOTE — PATIENT INSTRUCTIONS
[facility]  Dr Price, Endocrinology Department    [place] [address] [id number]  [place] [address]  Appointment [phone]  Fax: [phone]  [place]: Monday - Thursday      Decrease levothyroxine to 88 mcg/day.  Labs in 2 months.  Please make a lab appointment for blood work and follow up clinic appointment in 1 week after that to discuss results.    Take Levothyroxine on an empty stomach. Take it with a full glass of water at least 30 minutes to 1 hour before eating breakfast.   This medicine should be taken at least 4 hours before or 4 hours after these medicines: antacids (Maalox , Mylanta , Tums ), calcium supplements, cholestyramine (Prevalite , Questran ), colestipol (Colestid ), iron supplements, orlistat (Ace , Xenical ), simethicone (Gas-X , Mylicon ), and sucralfate (Carafate ).   Swallow the capsule whole. Do not cut or crush it.

## 2022-07-26 NOTE — PROGRESS NOTES
"THIS IS A VIDEO VISIT:    Phone call visit/virtual visit encounter:    Name of patient: Valeri Wise    Date of encounter: 7/26/2022    Time of start of video visit: 2:30    Video started: 2:37    Video ended: 2:53    Provider location: working from home/ [facility]    Patient location: patients home.    Mode of transmission: video/ Doximity    Verbal consent: obtained before starting visit. Pt is agreeable.      The patient has been notified of following:      \"This VIDEO visit will be conducted via a call between you and your physician/provider. We have found that certain health care needs can be provided without the need for a physical exam.  This service lets us provide the care you need with a short phone conversation.  If a prescription is necessary we can send it directly to your pharmacy.  If lab work is needed we can place an order for that and you can then stop by our lab to have the test done at a later time.     With new updates with corona virus patient might be billed as clinic visit.     If during the course of the call the physician/provider feels a telephone visit is not appropriate, you will not be charged for this service.\"      Past medical history, social history, family history, allergy and medications were reviewed and updated as appropriate.  Reviewed pertinent labs, notes, imaging studies personally.    ENDOCRINOLOGY CLINIC NOTE:  Name: Valeri Wise  Seen in consultation with Tatiana Oseguera for Hypothyroidism.  HPI:  Valeri Wise is a 52 year old female who presents for the evaluation of above.   has a past medical history of Disc degeneration, Hashimoto's thyroiditis (5/23/2022), and Uncomplicated asthma.    #1. Hypothyroidism (TPO positive):  Diagnosed in 4/2022.  She was having HA and had body aches and tingling as well as was fatigued so thyroid labs were checked which showed TSH 7.78, low FT and +TPO.  Also noted to have low vit B12.  She was started on levothyroxine 100 " mcg/day in 2022.  Takes generic.  Reports compliance.  Since starting medication she has noticed significant improvement.  HA are now better.  Lost some 15 lbs- intentional wt loss X 3 months.  Energy levels improved as well.    Palpitations:  No  Changes to hair or skin: No  Diarrhea/Constipation:Yes: + chronic constipation  Changes in menses: Yes: h/o hysterectomy  Changes in vision:No  Diplopia/Blurryness:Yes: sometimes in morning  Dysphagia or Shortness of breath:No  Muscle aches or pain:Yes: + improved  Tremors: no  Changes in weight: as noted above  Heat or cold intolerance:better  History of Lithium or Amiodarone use:No  Head or neck surgery/radiation:No  Family History of Thyroid Problems: Sister with hypothyroidism.  Wt Readings from Last 2 Encounters:   22 73.3 kg (161 lb 11.2 oz)   21 69.4 kg (153 lb)     PMH/PSH:  Past Medical History:   Diagnosis Date     Disc degeneration     neck     Hashimoto's thyroiditis 2022     Uncomplicated asthma      Past Surgical History:   Procedure Laterality Date     ZZC NONSPECIFIC PROCEDURE      Jaw surgery--corrective     ZZC NONSPECIFIC PROCEDURE           ZZC VAGINAL HYSTERECTOMY  10/2004     Family Hx:  Family History   Problem Relation Age of Onset     Heart Disease Maternal Grandfather      Cancer Maternal Grandfather         leukemia     Diabetes Maternal Grandfather      Coronary Artery Disease Maternal Grandfather      C.A.D. Maternal Grandmother      Other Cancer Maternal Grandmother         Leukemia     Breast Cancer No family hx of      Cancer - colorectal No family hx of      Social Hx:  Social History     Socioeconomic History     Marital status:      Spouse name: Not on file     Number of children: Not on file     Years of education: Not on file     Highest education level: Not on file   Occupational History     Not on file   Tobacco Use     Smoking status: Current Every Day Smoker     Packs/day: 0.50     Years: 5.00      Pack years: 2.50     Start date: 4/6/2021     Smokeless tobacco: Never Used     Tobacco comment: 1.5 PER WEEK    Vaping Use     Vaping Use: Never used   Substance and Sexual Activity     Alcohol use: Yes     Comment: Once or twice a week     Drug use: No     Sexual activity: Yes     Partners: Male     Birth control/protection: Female Surgical     Comment: 2004 Vaginal Hysterectomy   Other Topics Concern     Parent/sibling w/ CABG, MI or angioplasty before 65F 55M? No   Social History Narrative     Not on file     Social Determinants of Health     Financial Resource Strain: Not on file   Food Insecurity: Not on file   Transportation Needs: Not on file   Physical Activity: Not on file   Stress: Not on file   Social Connections: Not on file   Intimate Partner Violence: Not on file   Housing Stability: Not on file          MEDICATIONS:  has a current medication list which includes the following prescription(s): citalopram, gabapentin, and levothyroxine.    ROS   ROS: 10 point ROS neg other than the symptoms noted above in the HPI.    Physical Exam   VS: LMP 09/22/2004   GENERAL: healthy, alert and no distress  EYES: Eyes grossly normal to inspection, conjunctivae and sclerae normal  ENT: no nose swelling, nasal discharge.  Thyroid: no apparent thyroid nodules  RESP: no audible wheeze, cough, or visible cyanosis.  No visible retractions or increased work of breathing.  Able to speak fully in complete sentences.  ABDO: not evaluated.  EXTREMITIES: no hand tremors.  NEURO: Cranial nerves grossly intact, mentation intact and speech normal  SKIN: No apparent skin lesions, rash or edema seen   PSYCH: mentation appears normal, affect normal/bright, judgement and insight intact, normal speech and appearance well-groomed    LABS:  TFTs:  ENDO THYROID LABS-[facility] Latest Ref Rng & Units 6/9/2022 4/6/2022   TSH 0.40 - 4.00 mU/L 0.23 (L) 7.78 (H)   T4 5.0 - 11.0 ug/dL     FREE T4 0.76 - 1.46 ng/dL 1.12 0.72 (L)   THYR PEROXIDASE  FABBY <35 IU/mL  >5,000 (H)     TG/TPO:    All pertinent notes, labs, and images personally reviewed by me.     A/P  Ms.Valeri Wise is a 52 year old here for the evaluation of hypothyroidism:    #1 Hypothyroidism (+TPO):  Currently on levothyroxine 100 mcg/day which was started in 4/2022.  Takes generic.  Reports compliance.  Since starting medication she has noticed significant improvement.  Lost some 15 lbs- intentional wt loss X 3 months.  Energy levels improved as well.  Plan:  Discussed diagnosis, pathophysiology, management and treatment options of condition with pt.  Based on 6/2022 labs- Decrease levothyroxine to 88 mcg/day. (7/26/2022)  Labs in 2 months.  Please make a lab appointment for blood work and follow up clinic appointment in 1 week after that to discuss results.    Discussed s/s of hypothyroidism and hyperthyroidism to watch for.  The patient indicates understanding of these issues and agrees with the plan.      Follow-up:  2 months.    Karol Price MD  Endocrinology  [facility]  CC: Tatiana Oseguera      All questions were answered.  The patient indicates understanding of the above issues and agrees with the plan set forth.       Answers for HPI/ROS submitted by the patient on 7/26/2022  General Symptoms: Yes  Skin Symptoms: No  HENT Symptoms: No  EYE SYMPTOMS: No  HEART SYMPTOMS: No  LUNG SYMPTOMS: Yes  INTESTINAL SYMPTOMS: Yes  URINARY SYMPTOMS: No  GYNECOLOGIC SYMPTOMS: No  BREAST SYMPTOMS: No  SKELETAL SYMPTOMS: Yes  BLOOD SYMPTOMS: No  NERVOUS SYSTEM SYMPTOMS: Yes  MENTAL HEALTH SYMPTOMS: Yes  Fever: No  Loss of appetite: No  Weight loss: No  Weight gain: No  Fatigue: Yes  Night sweats: No  Chills: No  Increased stress: No  Excessive hunger: No  Excessive thirst: No  Feeling hot or cold when others believe the temperature is normal: Yes  Loss of height: No  Post-operative complications: No  Surgical site pain: No  Hallucinations: No  Change in or Loss of Energy:  No  Hyperactivity: No  Confusion: No  Cough: No  Sputum or phlegm: No  Coughing up blood: No  Difficulty breating or shortness of breath: No  Snoring: Yes  Wheezing: No  Difficulty breathing on exertion: No  Nighttime Cough: No  Difficulty breathing when lying flat: No  Heart burn or indigestion: No  Nausea: No  Vomiting: No  Abdominal pain: Yes  Bloating: Yes  Constipation: Yes  Diarrhea: Yes  Blood in stool: No  Black stools: No  Rectal or Anal pain: No  Fecal incontinence: No  Yellowing of skin or eyes: No  Vomit with blood: No  Change in stools: No  Back pain: Yes  Muscle aches: Yes  Neck pain: Yes  Swollen joints: No  Joint pain: Yes  Bone pain: No  Muscle cramps: Yes  Muscle weakness: No  Joint stiffness: Yes  Bone fracture: No  Trouble with coordination: No  Dizziness or trouble with balance: No  Fainting or black-out spells: No  Memory loss: No  Headache: Yes  Seizures: No  Speech problems: No  Tingling: Yes  Tremor: No  Weakness: No  Difficulty walking: No  Paralysis: No  Numbness: Yes  Nervous or Anxious: No  Depression: No  Trouble sleeping: Yes  Trouble thinking or concentrating: No  Mood changes: No  Panic attacks: No

## 2022-07-26 NOTE — LETTER
"    7/26/2022         RE: Valeri Wise  [address] [address] ([place], [place])  [place] [address]        Dear Colleague,    Thank you for referring your patient, Valeri Wise, to the [facility]. Please see a copy of my visit note below.    Valeri Wise  is being evaluated via a billable video visit.      How would you like to obtain your AVS? [id number]  For the video visit, send the invitation by: Text to cell phone: [phone]   Will anyone else be joining your video visit? No          THIS IS A VIDEO VISIT:    Phone call visit/virtual visit encounter:    Name of patient: Valeri Wise    Date of encounter: 7/26/2022    Time of start of video visit: 2:30    Video started: 2:37    Video ended: 2:53    Provider location: working from home/ [facility]    Patient location: patients home.    Mode of transmission: video/ Doximity    Verbal consent: obtained before starting visit. Pt is agreeable.      The patient has been notified of following:      \"This VIDEO visit will be conducted via a call between you and your physician/provider. We have found that certain health care needs can be provided without the need for a physical exam.  This service lets us provide the care you need with a short phone conversation.  If a prescription is necessary we can send it directly to your pharmacy.  If lab work is needed we can place an order for that and you can then stop by our lab to have the test done at a later time.     With new updates with corona virus patient might be billed as clinic visit.     If during the course of the call the physician/provider feels a telephone visit is not appropriate, you will not be charged for this service.\"      Past medical history, social history, family history, allergy and medications were reviewed and updated as appropriate.  Reviewed pertinent labs, notes, imaging studies personally.    ENDOCRINOLOGY CLINIC NOTE:  Name: Valeri Wise  Seen in consultation with Ana Rosa, " Tatiana Dexter for Hypothyroidism.  HPI:  Valeri Wise is a 52 year old female who presents for the evaluation of above.   has a past medical history of Disc degeneration, Hashimoto's thyroiditis (2022), and Uncomplicated asthma.    #1. Hypothyroidism (TPO positive):  Diagnosed in 2022.  She was having HA and had body aches and tingling as well as was fatigued so thyroid labs were checked which showed TSH 7.78, low FT and +TPO.  Also noted to have low vit B12.  She was started on levothyroxine 100 mcg/day in 2022.  Takes generic.  Reports compliance.  Since starting medication she has noticed significant improvement.  HA are now better.  Lost some 15 lbs- intentional wt loss X 3 months.  Energy levels improved as well.    Palpitations:  No  Changes to hair or skin: No  Diarrhea/Constipation:Yes: + chronic constipation  Changes in menses: Yes: h/o hysterectomy  Changes in vision:No  Diplopia/Blurryness:Yes: sometimes in morning  Dysphagia or Shortness of breath:No  Muscle aches or pain:Yes: + improved  Tremors: no  Changes in weight: as noted above  Heat or cold intolerance:better  History of Lithium or Amiodarone use:No  Head or neck surgery/radiation:No  Family History of Thyroid Problems: Sister with hypothyroidism.  Wt Readings from Last 2 Encounters:   22 73.3 kg (161 lb 11.2 oz)   21 69.4 kg (153 lb)     PMH/PSH:  Past Medical History:   Diagnosis Date     Disc degeneration     neck     Hashimoto's thyroiditis 2022     Uncomplicated asthma      Past Surgical History:   Procedure Laterality Date     ZZ NONSPECIFIC PROCEDURE      Jaw surgery--corrective     C NONSPECIFIC PROCEDURE           Z VAGINAL HYSTERECTOMY  10/2004     Family Hx:  Family History   Problem Relation Age of Onset     Heart Disease Maternal Grandfather      Cancer Maternal Grandfather         leukemia     Diabetes Maternal Grandfather      Coronary Artery Disease Maternal Grandfather      ANG  Maternal Grandmother      Other Cancer Maternal Grandmother         Leukemia     Breast Cancer No family hx of      Cancer - colorectal No family hx of      Social Hx:  Social History     Socioeconomic History     Marital status:      Spouse name: Not on file     Number of children: Not on file     Years of education: Not on file     Highest education level: Not on file   Occupational History     Not on file   Tobacco Use     Smoking status: Current Every Day Smoker     Packs/day: 0.50     Years: 5.00     Pack years: 2.50     Start date: 4/6/2021     Smokeless tobacco: Never Used     Tobacco comment: 1.5 PER WEEK    Vaping Use     Vaping Use: Never used   Substance and Sexual Activity     Alcohol use: Yes     Comment: Once or twice a week     Drug use: No     Sexual activity: Yes     Partners: Male     Birth control/protection: Female Surgical     Comment: 2004 Vaginal Hysterectomy   Other Topics Concern     Parent/sibling w/ CABG, MI or angioplasty before 65F 55M? No   Social History Narrative     Not on file     Social Determinants of Health     Financial Resource Strain: Not on file   Food Insecurity: Not on file   Transportation Needs: Not on file   Physical Activity: Not on file   Stress: Not on file   Social Connections: Not on file   Intimate Partner Violence: Not on file   Housing Stability: Not on file          MEDICATIONS:  has a current medication list which includes the following prescription(s): citalopram, gabapentin, and levothyroxine.    ROS   ROS: 10 point ROS neg other than the symptoms noted above in the HPI.    Physical Exam   VS: LMP 09/22/2004   GENERAL: healthy, alert and no distress  EYES: Eyes grossly normal to inspection, conjunctivae and sclerae normal  ENT: no nose swelling, nasal discharge.  Thyroid: no apparent thyroid nodules  RESP: no audible wheeze, cough, or visible cyanosis.  No visible retractions or increased work of breathing.  Able to speak fully in complete  sentences.  ABDO: not evaluated.  EXTREMITIES: no hand tremors.  NEURO: Cranial nerves grossly intact, mentation intact and speech normal  SKIN: No apparent skin lesions, rash or edema seen   PSYCH: mentation appears normal, affect normal/bright, judgement and insight intact, normal speech and appearance well-groomed    LABS:  TFTs:  ENDO THYROID LABS-[facility] Latest Ref Rng & Units 6/9/2022 4/6/2022   TSH 0.40 - 4.00 mU/L 0.23 (L) 7.78 (H)   T4 5.0 - 11.0 ug/dL     FREE T4 0.76 - 1.46 ng/dL 1.12 0.72 (L)   THYR PEROXIDASE FABBY <35 IU/mL  >5,000 (H)     TG/TPO:    All pertinent notes, labs, and images personally reviewed by me.     A/P  Ms.Nichole SAMARA Wise is a 52 year old here for the evaluation of hypothyroidism:    #1 Hypothyroidism (+TPO):  Currently on levothyroxine 100 mcg/day which was started in 4/2022.  Takes generic.  Reports compliance.  Since starting medication she has noticed significant improvement.  Lost some 15 lbs- intentional wt loss X 3 months.  Energy levels improved as well.  Plan:  Discussed diagnosis, pathophysiology, management and treatment options of condition with pt.  Based on 6/2022 labs- Decrease levothyroxine to 88 mcg/day. (7/26/2022)  Labs in 2 months.  Please make a lab appointment for blood work and follow up clinic appointment in 1 week after that to discuss results.    Discussed s/s of hypothyroidism and hyperthyroidism to watch for.  The patient indicates understanding of these issues and agrees with the plan.      Follow-up:  2 months.    Karol Price MD  Endocrinology  [facility]  CC: Tatiana Oseguera      All questions were answered.  The patient indicates understanding of the above issues and agrees with the plan set forth.       Answers for HPI/ROS submitted by the patient on 7/26/2022  General Symptoms: Yes  Skin Symptoms: No  HENT Symptoms: No  EYE SYMPTOMS: No  HEART SYMPTOMS: No  LUNG SYMPTOMS: Yes  INTESTINAL SYMPTOMS: Yes  URINARY SYMPTOMS:  No  GYNECOLOGIC SYMPTOMS: No  BREAST SYMPTOMS: No  SKELETAL SYMPTOMS: Yes  BLOOD SYMPTOMS: No  NERVOUS SYSTEM SYMPTOMS: Yes  MENTAL HEALTH SYMPTOMS: Yes  Fever: No  Loss of appetite: No  Weight loss: No  Weight gain: No  Fatigue: Yes  Night sweats: No  Chills: No  Increased stress: No  Excessive hunger: No  Excessive thirst: No  Feeling hot or cold when others believe the temperature is normal: Yes  Loss of height: No  Post-operative complications: No  Surgical site pain: No  Hallucinations: No  Change in or Loss of Energy: No  Hyperactivity: No  Confusion: No  Cough: No  Sputum or phlegm: No  Coughing up blood: No  Difficulty breating or shortness of breath: No  Snoring: Yes  Wheezing: No  Difficulty breathing on exertion: No  Nighttime Cough: No  Difficulty breathing when lying flat: No  Heart burn or indigestion: No  Nausea: No  Vomiting: No  Abdominal pain: Yes  Bloating: Yes  Constipation: Yes  Diarrhea: Yes  Blood in stool: No  Black stools: No  Rectal or Anal pain: No  Fecal incontinence: No  Yellowing of skin or eyes: No  Vomit with blood: No  Change in stools: No  Back pain: Yes  Muscle aches: Yes  Neck pain: Yes  Swollen joints: No  Joint pain: Yes  Bone pain: No  Muscle cramps: Yes  Muscle weakness: No  Joint stiffness: Yes  Bone fracture: No  Trouble with coordination: No  Dizziness or trouble with balance: No  Fainting or black-out spells: No  Memory loss: No  Headache: Yes  Seizures: No  Speech problems: No  Tingling: Yes  Tremor: No  Weakness: No  Difficulty walking: No  Paralysis: No  Numbness: Yes  Nervous or Anxious: No  Depression: No  Trouble sleeping: Yes  Trouble thinking or concentrating: No  Mood changes: No  Panic attacks: No          Again, thank you for allowing me to participate in the care of your patient.        Sincerely,        Karol Price MD

## 2022-08-13 ENCOUNTER — HEALTH MAINTENANCE LETTER (OUTPATIENT)
Age: 52
End: 2022-08-13

## 2022-09-02 ENCOUNTER — OFFICE VISIT (OUTPATIENT)
Dept: ORTHOPEDICS | Facility: CLINIC | Age: 52
End: 2022-09-02
Payer: COMMERCIAL

## 2022-09-02 VITALS
SYSTOLIC BLOOD PRESSURE: 130 MMHG | BODY MASS INDEX: 25.02 KG/M2 | HEIGHT: 65 IN | WEIGHT: 150.2 LBS | DIASTOLIC BLOOD PRESSURE: 92 MMHG

## 2022-09-02 DIAGNOSIS — M75.42 ROTATOR CUFF IMPINGEMENT SYNDROME OF LEFT SHOULDER: Primary | ICD-10-CM

## 2022-09-02 PROCEDURE — 99204 OFFICE O/P NEW MOD 45 MIN: CPT | Performed by: ORTHOPAEDIC SURGERY

## 2022-09-02 NOTE — LETTER
2022         RE: Valeri Wise  [address] [facility] [id number]        Dear Colleague,    Thank you for referring your patient, Valeri Wise, to the [facility] [place]. Please see a copy of my visit note below.    CHIEF COMPLAINT: left shoulder pain    DIAGNOSIS: Left shoulder rotator cuff tendinitis     HPI:   Valeri Wise is a very pleasant 52 year old, left-hand dominant female who presents for evaluation of left shoulder pain. Gradual onset, worsening pain over last 9 months.  Symptoms started 9 months ago with no known injury and have been gradually worsening. Worst pain is rated a 7 of 10, and current pain is rated at 2 of 10. Symptoms are worsened by use of shoulder, overhead movement and reaching behind back, sleeping on left side, pushing and lifting. Patient has intermittent pain, numbness and tingling radiating down the arm, but states this was present prior to the start of her shoulder pain.  Notably, the patient has history of cervical DDD and herniation. No other concerns or complaints at this time.    PAST MEDICAL HISTORY:  Past Medical History:   Diagnosis Date     Disc degeneration     neck     Hashimoto's thyroiditis 2022     Uncomplicated asthma        PAST SURGICAL HISTORY:  Past Surgical History:   Procedure Laterality Date     [facility] NONSPECIFIC PROCEDURE      Jaw surgery--corrective     [facility] NONSPECIFIC PROCEDURE           [facility] VAGINAL HYSTERECTOMY  10/2004       CURRENT MEDICATIONS:  Current Outpatient Medications   Medication Sig Dispense Refill     citalopram (CELEXA) 20 MG tablet Take 1 tablet (20 mg) by mouth daily 90 tablet 3     gabapentin (NEURONTIN) 300 MG capsule Take 1 capsule (300 mg) by mouth 3 times daily 90 capsule 11     levothyroxine (SYNTHROID/LEVOTHROID) 88 MCG tablet Take 1 tablet (88 mcg) by mouth daily 90 tablet 1       ALLERGIES:      Allergies   Allergen Reactions     Sulfa Drugs      hives         FAMILY  "HISTORY: No pertinent family history, reviewed in EMR.    SOCIAL HISTORY:   Social History     Socioeconomic History     Marital status:      Spouse name: Not on file     Number of children: Not on file     Years of education: Not on file     Highest education level: Not on file   Occupational History     Not on file   Tobacco Use     Smoking status: Current Every Day Smoker     Packs/day: 0.50     Years: 5.00     Pack years: 2.50     Start date: 4/6/2021     Smokeless tobacco: Never Used     Tobacco comment: 1.5 PER WEEK    Vaping Use     Vaping Use: Never used   Substance and Sexual Activity     Alcohol use: Yes     Comment: Once or twice a week     Drug use: No     Sexual activity: Yes     Partners: Male     Birth control/protection: Female Surgical     Comment: 2004 Vaginal Hysterectomy   Other Topics Concern     Parent/sibling w/ CABG, MI or angioplasty before 65F 55M? No   Social History Narrative     Not on file     Social Determinants of Health     Financial Resource Strain: Not on file   Food Insecurity: Not on file   Transportation Needs: Not on file   Physical Activity: Not on file   Stress: Not on file   Social Connections: Not on file   Intimate Partner Violence: Not on file   Housing Stability: Not on file       REVIEW OF SYSTEMS: Positive for that noted in past medical history and history of present illness and otherwise reviewed in EMR    PHYSICAL EXAM:  Patient is 5' 5\" and weighs 150 lbs 3.2 oz LMP 09/22/2004   Body mass index is 24.99 kg/m .   Constitutional: Well-developed, well-nourished, healthy appearing female.  Skin: Warm, dry   HEENT: Normal  Cardiac: Well perfused extremities, strong 2+ peripheral pulses. No edema.   Pulmonary: Breathing room air    Musculoskeletal:   LEft Shoulder:  AROM left shoulder: 180/180/60/T10   AROM right shoulder: 180/180/60/T10   PROM left shoulder: 180/180/60/T10   5/5 supraspinatus, 5/5 infraspinatus, 5/5 subscapularis  No AC joint pain, negative cross " body adduction  Positive Neer and Romero impingement signs  Negative belly-press/lift-off  Positive Speed's test  Negative apprehension  Negative jerk test  Neurovascular exam and cervical spine exam are normal.    X-RAYS:   AP, lateral, zanca, and axillary radiographs of the left shoulder were ordered and reviewed by me personally showing well-maintained glenohumeral joint space, minimal AC arthrosis    ADVANCED IMAGING:     IMPRESSION: 52 year old-year-old left hand dominant female, with left shoulder likely rotator cuff tendinitis.     PLAN:     I discussed with the patient the etiology of their condition. We discussed at length the options as noted above.  I believe that the patient likely has a component of rotator cuff tendinitis and tendinosis.  Patient has full motion and strength I am less concerned about a full-thickness rotator cuff tear.  I gave recommendations for conservative treatment measures including activity modification, heat and ice, over-the-counter anti-inflammatories as needed, physical therapy.  We also discussed a cortisone injection but given that the pain is tolerable at this time we are can hold off on a cortisone injection, patient knows to call if he would like a cortisone injection sooner.  We will start with physical therapy, I will see the patient back in 2 to 3 months for reevaluation.    At the conclusion of the office visit, Valeri verbally acknowledged that I answered all of her questions satisfactorily.    Marivel Solano MD  Orthopedic Surgery Sports Medicine and Shoulder Surgery        Again, thank you for allowing me to participate in the care of your patient.        Sincerely,        MARIVEL SOLANO MD

## 2022-09-02 NOTE — PROGRESS NOTES
CHIEF COMPLAINT: left shoulder pain    DIAGNOSIS: Left shoulder rotator cuff tendinitis     HPI:   Valeri Wise is a very pleasant 52 year old, left-hand dominant female who presents for evaluation of left shoulder pain. Gradual onset, worsening pain over last 9 months.  Symptoms started 9 months ago with no known injury and have been gradually worsening. Worst pain is rated a 7 of 10, and current pain is rated at 2 of 10. Symptoms are worsened by use of shoulder, overhead movement and reaching behind back, sleeping on left side, pushing and lifting. Patient has intermittent pain, numbness and tingling radiating down the arm, but states this was present prior to the start of her shoulder pain.  Notably, the patient has history of cervical DDD and herniation. No other concerns or complaints at this time.    PAST MEDICAL HISTORY:  Past Medical History:   Diagnosis Date     Disc degeneration     neck     Hashimoto's thyroiditis 2022     Uncomplicated asthma        PAST SURGICAL HISTORY:  Past Surgical History:   Procedure Laterality Date     [facility] NONSPECIFIC PROCEDURE      Jaw surgery--corrective     [facility] NONSPECIFIC PROCEDURE           [facility] VAGINAL HYSTERECTOMY  10/2004       CURRENT MEDICATIONS:  Current Outpatient Medications   Medication Sig Dispense Refill     citalopram (CELEXA) 20 MG tablet Take 1 tablet (20 mg) by mouth daily 90 tablet 3     gabapentin (NEURONTIN) 300 MG capsule Take 1 capsule (300 mg) by mouth 3 times daily 90 capsule 11     levothyroxine (SYNTHROID/LEVOTHROID) 88 MCG tablet Take 1 tablet (88 mcg) by mouth daily 90 tablet 1       ALLERGIES:      Allergies   Allergen Reactions     Sulfa Drugs      hives         FAMILY HISTORY: No pertinent family history, reviewed in EMR.    SOCIAL HISTORY:   Social History     Socioeconomic History     Marital status:      Spouse name: Not on file     Number of children: Not on file     Years of education: Not on file     Highest  "education level: Not on file   Occupational History     Not on file   Tobacco Use     Smoking status: Current Every Day Smoker     Packs/day: 0.50     Years: 5.00     Pack years: 2.50     Start date: 4/6/2021     Smokeless tobacco: Never Used     Tobacco comment: 1.5 PER WEEK    Vaping Use     Vaping Use: Never used   Substance and Sexual Activity     Alcohol use: Yes     Comment: Once or twice a week     Drug use: No     Sexual activity: Yes     Partners: Male     Birth control/protection: Female Surgical     Comment: 2004 Vaginal Hysterectomy   Other Topics Concern     Parent/sibling w/ CABG, MI or angioplasty before 65F 55M? No   Social History Narrative     Not on file     Social Determinants of Health     Financial Resource Strain: Not on file   Food Insecurity: Not on file   Transportation Needs: Not on file   Physical Activity: Not on file   Stress: Not on file   Social Connections: Not on file   Intimate Partner Violence: Not on file   Housing Stability: Not on file       REVIEW OF SYSTEMS: Positive for that noted in past medical history and history of present illness and otherwise reviewed in EMR    PHYSICAL EXAM:  Patient is 5' 5\" and weighs 150 lbs 3.2 oz LMP 09/22/2004   Body mass index is 24.99 kg/m .   Constitutional: Well-developed, well-nourished, healthy appearing female.  Skin: Warm, dry   HEENT: Normal  Cardiac: Well perfused extremities, strong 2+ peripheral pulses. No edema.   Pulmonary: Breathing room air    Musculoskeletal:   LEft Shoulder:  AROM left shoulder: 180/180/60/T10   AROM right shoulder: 180/180/60/T10   PROM left shoulder: 180/180/60/T10   5/5 supraspinatus, 5/5 infraspinatus, 5/5 subscapularis  No AC joint pain, negative cross body adduction  Positive Neer and Romero impingement signs  Negative belly-press/lift-off  Positive Speed's test  Negative apprehension  Negative jerk test  Neurovascular exam and cervical spine exam are normal.    X-RAYS:   AP, lateral, zanca, and axillary " radiographs of the left shoulder were ordered and reviewed by me personally showing well-maintained glenohumeral joint space, minimal AC arthrosis    ADVANCED IMAGING:     IMPRESSION: 52 year old-year-old left hand dominant female, with left shoulder likely rotator cuff tendinitis.     PLAN:     I discussed with the patient the etiology of their condition. We discussed at length the options as noted above.  I believe that the patient likely has a component of rotator cuff tendinitis and tendinosis.  Patient has full motion and strength I am less concerned about a full-thickness rotator cuff tear.  I gave recommendations for conservative treatment measures including activity modification, heat and ice, over-the-counter anti-inflammatories as needed, physical therapy.  We also discussed a cortisone injection but given that the pain is tolerable at this time we are can hold off on a cortisone injection, patient knows to call if he would like a cortisone injection sooner.  We will start with physical therapy, I will see the patient back in 2 to 3 months for reevaluation.    At the conclusion of the office visit, Valeri verbally acknowledged that I answered all of her questions satisfactorily.    Marivel Davila MD  Orthopedic Surgery Sports Medicine and Shoulder Surgery

## 2022-09-02 NOTE — PATIENT INSTRUCTIONS
[facility] [address], [address]  [address] [address]   Appointments: [phone] Appointments: [phone]   Fax: [phone] Fax: [phone]       Physical Therapy:   Orders for physical therapy have been placed. Please call [phone] to schedule at your convenience.      Please contact my office with any questions, [phone].    normal sinus rhythm

## 2022-09-16 ENCOUNTER — THERAPY VISIT (OUTPATIENT)
Dept: PHYSICAL THERAPY | Facility: CLINIC | Age: 52
End: 2022-09-16
Attending: ORTHOPAEDIC SURGERY
Payer: COMMERCIAL

## 2022-09-16 DIAGNOSIS — G89.29 CHRONIC LEFT SHOULDER PAIN: ICD-10-CM

## 2022-09-16 DIAGNOSIS — M75.42 ROTATOR CUFF IMPINGEMENT SYNDROME OF LEFT SHOULDER: ICD-10-CM

## 2022-09-16 DIAGNOSIS — M25.512 CHRONIC LEFT SHOULDER PAIN: ICD-10-CM

## 2022-09-16 PROCEDURE — 97161 PT EVAL LOW COMPLEX 20 MIN: CPT | Mod: GP | Performed by: PHYSICAL THERAPIST

## 2022-09-16 PROCEDURE — 97110 THERAPEUTIC EXERCISES: CPT | Mod: GP | Performed by: PHYSICAL THERAPIST

## 2022-09-16 NOTE — PROGRESS NOTES
Physical Therapy Initial Evaluation  Subjective:  The history is provided by the patient. No  was used.   Patient Health History  Valeri Wise being seen for rotator cuff tendonitis.     Problem began: 1/1/2022.   Problem occurred: over time, not due to one incident   Pain is reported as 3/10 on pain scale.  General health as reported by patient is good.  Pertinent medical history includes: numbness/tingling, pain at night/rest, smoking and thyroid problems.     Medical allergies: other. Other medical allergies details: sulfa based drugs.    Other surgery history details: jaw surgery 1988; radical histerectomy 2004.    Current medications:  Thyroid medication and other. Other medications details: citalopram for anxiety; gabapentin for nerve pain.    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   Primary job tasks include:  Computer work.                  Therapist Generated HPI Evaluation  Problem details: Pt. complains of L shoulder pain that has been present for 9 months.  No known trauma.  PT order dated 9/2/22.      .         Type of problem:  Left shoulder.    This is a chronic condition.  Condition occurred with:  Unknown cause.  Where condition occurred: for unknown reasons.  Patient reports pain:  Upper arm and in the joint.  Pain is described as aching and is intermittent.  Pain radiates to:  Shoulder. Pain is worse during the night.  Since onset symptoms are unchanged.  Associated symptoms:  Loss of strength and loss of motion/stiffness. Symptoms are exacerbated by lying on extremity, using arm at shoulder level, using arm overhead and using arm behind back  and relieved by rest.  Special tests included:  X-ray (negative).    Restrictions due to condition include:  Working in normal job without restrictions.  Barriers include:  None as reported by patient.                        Objective:        Flexibility/Screens:   Positive screens:  ShoulderNegative  screens: Cervical                              Shoulder Evaluation:  ROM:  AROM:  normal                            PROM:  normal                            Pain: combined ext/IR on left    Strength:    Flexion: Left:4+/5    Pain: -    Right: /5    Pain:  -    Abduction:  Left: 4/5   Pain:-/+    Right: 5/5     Pain:    Internal Rotation:  Left:4+/5      Pain:-    Right: 5/5      Pain:-  External Rotation:   Left:4/5      Pain:+   Right:5/5      Pain:-            Stability Testing:  normal      Special Tests:    Left shoulder positive for the following special tests:  Impingement  Left shoulder negative for the following special tests:  Bursal and Rotator cuff tear    Right shoulder negative for the following special tests:Impingement; Bursal and Rotator cuff tear  Palpation:  normal      Mobility Tests:    Glenohumeral anterior left:  Normal  Glenohumeral anterior right:  Normal  Glenohumeral posterior left:  Hypomobile  Glenohumeral posterior right:  Normal  Glenohumeral inferior left:  Normal  Glenohumeral inferior right:  Normal                                             General     ROS    Assessment/Plan:    Patient is a 52 year old female with left side shoulder complaints.    Patient has the following significant findings with corresponding treatment plan.                Diagnosis 1:  L shoulder pain  Pain -  self management, education, directional preference exercise and home program  Decreased strength - therapeutic exercise and therapeutic activities  Impaired muscle performance - neuro re-education  Decreased function - therapeutic activities    Therapy Evaluation Codes:   1) Clinical presentation characteristics are:   Stable/Uncomplicated.  2) Decision-Making    Low complexity using standardized patient assessment instrument and/or measureable assessment of functional outcome.  Cumulative Therapy Evaluation is: Low complexity.    Previous and current functional limitations:  (See Goal Flow Sheet for  this information)    Short term and Long term goals: (See Goal Flow Sheet for this information)     Communication ability:  Patient appears to be able to clearly communicate and understand verbal and written communication and follow directions correctly.  Treatment Explanation - The following has been discussed with the patient:   RX ordered/plan of care  Anticipated outcomes  Possible risks and side effects  This patient would benefit from PT intervention to resume normal activities.   Rehab potential is good.    Frequency:  1 X week, once daily  Duration:  for 6 weeks  Discharge Plan:  Achieve all LTG.  Independent in home treatment program.  Reach maximal therapeutic benefit.    Please refer to the daily flowsheet for treatment today, total treatment time and time spent performing 1:1 timed codes.

## 2022-09-22 ENCOUNTER — THERAPY VISIT (OUTPATIENT)
Dept: PHYSICAL THERAPY | Facility: CLINIC | Age: 52
End: 2022-09-22
Payer: COMMERCIAL

## 2022-09-22 DIAGNOSIS — G89.29 CHRONIC LEFT SHOULDER PAIN: Primary | ICD-10-CM

## 2022-09-22 DIAGNOSIS — M25.512 CHRONIC LEFT SHOULDER PAIN: Primary | ICD-10-CM

## 2022-09-22 PROCEDURE — 97110 THERAPEUTIC EXERCISES: CPT | Mod: GP | Performed by: PHYSICAL THERAPIST

## 2022-09-27 ENCOUNTER — LAB (OUTPATIENT)
Dept: LAB | Facility: CLINIC | Age: 52
End: 2022-09-27
Payer: COMMERCIAL

## 2022-09-27 DIAGNOSIS — R76.8 ANTI-TPO ANTIBODIES PRESENT: ICD-10-CM

## 2022-09-27 DIAGNOSIS — E03.9 HYPOTHYROIDISM, UNSPECIFIED TYPE: ICD-10-CM

## 2022-09-27 PROCEDURE — 36415 COLL VENOUS BLD VENIPUNCTURE: CPT

## 2022-09-27 PROCEDURE — 84443 ASSAY THYROID STIM HORMONE: CPT

## 2022-09-27 PROCEDURE — 84439 ASSAY OF FREE THYROXINE: CPT

## 2022-09-28 LAB
T4 FREE SERPL-MCNC: 0.92 NG/DL (ref 0.76–1.46)
TSH SERPL DL<=0.005 MIU/L-ACNC: 1.62 MU/L (ref 0.4–4)

## 2022-10-25 ENCOUNTER — THERAPY VISIT (OUTPATIENT)
Dept: PHYSICAL THERAPY | Facility: CLINIC | Age: 52
End: 2022-10-25
Payer: COMMERCIAL

## 2022-10-25 DIAGNOSIS — G89.29 CHRONIC LEFT SHOULDER PAIN: Primary | ICD-10-CM

## 2022-10-25 DIAGNOSIS — M25.512 CHRONIC LEFT SHOULDER PAIN: Primary | ICD-10-CM

## 2022-10-25 PROCEDURE — 97110 THERAPEUTIC EXERCISES: CPT | Mod: GP | Performed by: PHYSICAL THERAPIST

## 2022-10-30 ENCOUNTER — HEALTH MAINTENANCE LETTER (OUTPATIENT)
Age: 52
End: 2022-10-30

## 2022-11-30 NOTE — PROGRESS NOTES
Discharge Note    Progress reporting period is from initial eval to Oct 25, 2022.     Valeri failed to return for next follow up visit and current status is unknown.  Please see information below for last relevant information on current status.  Patient seen for Rxs Used: 3 visits.  SUBJECTIVE  Subjective changes noted by patient:  Subjective: Pt reports continued slow progress.  Sore after moving furniture a few weeks ago.  .  Current pain level is Current Pain level: 4/10.     Previous pain level was  Initial Pain level: 7/10.   Changes in function:  Yes (See Goal flowsheet attached for changes in current functional level)  Adverse reaction to treatment or activity: None    OBJECTIVE  Changes noted in objective findings: Objective: AROM WNL.  VC's for HEP correction.  Reaching behind her back is her C/c still     ASSESSMENT/PLAN  Diagnosis: L shoulder pain   DIAGP:  The encounter diagnosis was Chronic left shoulder pain.  Updated problem list and treatment plan:   Decreased function - HEP  STG/LTGs have been met or progress has been made towards goals:  Yes, please see goal flowsheet for most current information  Assessment of Progress: current status is unknown.  Last current status: Assessment of progress: Pt is progressing as expected   Self Management Plans:  HEP  I have re-evaluated this patient and find that the nature, scope, duration and intensity of the therapy is appropriate for the medical condition of the patient.  Valeri continues to require the following intervention to meet STG and LTG's:  HEP.    Recommendations:  Discharge with current home program.  Patient to follow up with MD as needed.    Please refer to the daily flowsheet for treatment today, total treatment time and time spent performing 1:1 timed codes.

## 2022-12-13 ENCOUNTER — VIRTUAL VISIT (OUTPATIENT)
Dept: ENDOCRINOLOGY | Facility: CLINIC | Age: 52
End: 2022-12-13
Payer: COMMERCIAL

## 2022-12-13 DIAGNOSIS — R76.8 ANTI-TPO ANTIBODIES PRESENT: ICD-10-CM

## 2022-12-13 DIAGNOSIS — E03.9 HYPOTHYROIDISM, UNSPECIFIED TYPE: ICD-10-CM

## 2022-12-13 DIAGNOSIS — E06.3 HASHIMOTO'S THYROIDITIS: Primary | ICD-10-CM

## 2022-12-13 PROCEDURE — 99214 OFFICE O/P EST MOD 30 MIN: CPT | Mod: GT | Performed by: INTERNAL MEDICINE

## 2022-12-13 RX ORDER — LEVOTHYROXINE SODIUM 88 UG/1
88 TABLET ORAL DAILY
Qty: 90 TABLET | Refills: 1 | Status: SHIPPED | OUTPATIENT
Start: 2022-12-13 | End: 2023-06-16

## 2022-12-13 ASSESSMENT — ENCOUNTER SYMPTOMS
DISTURBANCES IN COORDINATION: 0
NERVOUS/ANXIOUS: 1
DOUBLE VISION: 1
STIFFNESS: 1
RECTAL PAIN: 0
LOSS OF CONSCIOUSNESS: 0
DIARRHEA: 1
VOMITING: 0
CONSTIPATION: 1
INSOMNIA: 1
FATIGUE: 1
PANIC: 0
DIZZINESS: 0
DECREASED CONCENTRATION: 0
HALLUCINATIONS: 0
ARTHRALGIAS: 1
PARALYSIS: 0
NIGHT SWEATS: 1
INCREASED ENERGY: 0
MYALGIAS: 1
JAUNDICE: 0
SPEECH CHANGE: 0
DEPRESSION: 0
TREMORS: 0
NUMBNESS: 1
EYE REDNESS: 0
SEIZURES: 0
WEIGHT LOSS: 0
EYE PAIN: 0
HEADACHES: 1
HEARTBURN: 1
POLYPHAGIA: 0
EYE WATERING: 0
BLOOD IN STOOL: 0
JOINT SWELLING: 1
ALTERED TEMPERATURE REGULATION: 1
ABDOMINAL PAIN: 1
TINGLING: 1
BLOATING: 1
WEIGHT GAIN: 1
WEAKNESS: 0
NECK PAIN: 1
NAUSEA: 1
BACK PAIN: 1
MEMORY LOSS: 0
MUSCLE WEAKNESS: 0
DECREASED APPETITE: 0
POLYDIPSIA: 0
EYE IRRITATION: 0
MUSCLE CRAMPS: 1
BOWEL INCONTINENCE: 0
FEVER: 0

## 2022-12-13 NOTE — PROGRESS NOTES
Valeri Wise  is being evaluated via a billable video visit.      How would you like to obtain your AVS? [email or web address]  For the video visit, send the invitation by: Send to e-mail at: [email or web address]  Will anyone else be joining your video visit? No

## 2022-12-13 NOTE — LETTER
"    12/13/2022         RE: Valeri Wise  [id number]  [place] [address]        Dear Colleague,    Thank you for referring your patient, Valeri Wise, to the [facility]. Please see a copy of my visit note below.    THIS IS A VIDEO VISIT:    Phone call visit/virtual visit encounter:    Name of patient: Valeri Wise    Date of encounter: 12/13/2022    Time of start of video visit: 4:00    Video started: 4:13    Video ended: 4:21    Provider location: working from home/ [facility]    Patient location: patients home.    Mode of transmission: video/ Doximity    Verbal consent: obtained before starting visit. Pt is agreeable.      The patient has been notified of following:      \"This VIDEO visit will be conducted via a call between you and your physician/provider. We have found that certain health care needs can be provided without the need for a physical exam.  This service lets us provide the care you need with a short phone conversation.  If a prescription is necessary we can send it directly to your pharmacy.  If lab work is needed we can place an order for that and you can then stop by our lab to have the test done at a later time.     With new updates with corona virus patient might be billed as clinic visit.     If during the course of the call the physician/provider feels a telephone visit is not appropriate, you will not be charged for this service.\"      Past medical history, social history, family history, allergy and medications were reviewed and updated as appropriate.  Reviewed pertinent labs, notes, imaging studies personally.    ENDOCRINOLOGY CLINIC NOTE:  Name: Valeri Wise  Seen or f/u of  Hypothyroidism.  HPI:  Valeri Wise is a 52 year old female who presents for the evaluation of above.   has a past medical history of Disc degeneration, Hashimoto's thyroiditis (5/23/2022), and Uncomplicated asthma.    #1. Hypothyroidism (TPO positive):  Diagnosed in " 2022.  She was having HA and had body aches and tingling as well as was fatigued so thyroid labs were checked which showed TSH 7.78, low FT and +TPO.  Also noted to have low vit B12.  She was started on levothyroxine 100 mcg/day in 2022.    Currently taking levothyroxine 88 mcg/day. (on this dose X 2022)  F/u labs as noted below.  Takes generic.  Reports compliance.    + muscle pain.  Quit smoking 6 weeks back.  + wt gain since then 5 lbs    Palpitations:  No  Changes to hair or skin: No  Diarrhea/Constipation:Yes: + chronic constipation  Changes in menses: Yes: h/o hysterectomy  Changes in vision:No  Dysphagia or Shortness of breath:No  Muscle aches or pain:Yes: + muscle pain  Tremors: no  Changes in weight: as noted above  Heat or cold intolerance:better  History of Lithium or Amiodarone use:No  Head or neck surgery/radiation:No  Family History of Thyroid Problems: Sister with hypothyroidism.  Wt Readings from Last 2 Encounters:   22 68.1 kg (150 lb 3.2 oz)   22 73.3 kg (161 lb 11.2 oz)     PMH/PSH:  Past Medical History:   Diagnosis Date     Disc degeneration     neck     Hashimoto's thyroiditis 2022     Uncomplicated asthma      Past Surgical History:   Procedure Laterality Date     [facility] NONSPECIFIC PROCEDURE      Jaw surgery--corrective     [facility] NONSPECIFIC PROCEDURE           [facility] VAGINAL HYSTERECTOMY  10/2004     Family Hx:  Family History   Problem Relation Age of Onset     Heart Disease Maternal Grandfather      Cancer Maternal Grandfather         leukemia     Diabetes Maternal Grandfather      Coronary Artery Disease Maternal Grandfather      C.A.D. Maternal Grandmother      Other Cancer Maternal Grandmother         Leukemia     Breast Cancer No family hx of      Cancer - colorectal No family hx of      Social Hx:  Social History     Socioeconomic History     Marital status:      Spouse name: Not on file     Number of children: Not on file     Years of education:  Not on file     Highest education level: Not on file   Occupational History     Not on file   Tobacco Use     Smoking status: Every Day     Packs/day: 0.50     Years: 5.00     Pack years: 2.50     Types: Cigarettes     Start date: 4/6/2021     Smokeless tobacco: Never     Tobacco comments:     1.5 PER WEEK    Vaping Use     Vaping Use: Never used   Substance and Sexual Activity     Alcohol use: Yes     Comment: Once or twice a week     Drug use: No     Sexual activity: Yes     Partners: Male     Birth control/protection: Female Surgical     Comment: 2004 Vaginal Hysterectomy   Other Topics Concern     Parent/sibling w/ CABG, MI or angioplasty before 65F 55M? No   Social History Narrative     Not on file     Social Determinants of Health     Financial Resource Strain: Not on file   Food Insecurity: Not on file   Transportation Needs: Not on file   Physical Activity: Not on file   Stress: Not on file   Social Connections: Not on file   Intimate Partner Violence: Not on file   Housing Stability: Not on file          MEDICATIONS:  has a current medication list which includes the following prescription(s): citalopram, gabapentin, and levothyroxine.    ROS   ROS: 10 point ROS neg other than the symptoms noted above in the HPI.    Physical Exam   VS: LMP 09/22/2004   GENERAL: healthy, alert and no distress  EYES: Eyes grossly normal to inspection, conjunctivae and sclerae normal  ENT: no nose swelling, nasal discharge.  Thyroid: no apparent thyroid nodules  RESP: no audible wheeze, cough, or visible cyanosis.  No visible retractions or increased work of breathing.  Able to speak fully in complete sentences.  ABDO: not evaluated.  EXTREMITIES: no hand tremors.  NEURO: Cranial nerves grossly intact, mentation intact and speech normal  SKIN: No apparent skin lesions, rash or edema seen   PSYCH: mentation appears normal, affect normal/bright, judgement and insight intact, normal speech and appearance  well-groomed    LABS:  TFTs:  ENDO THYROID LABS-[facility] Latest Ref Rng & Units 9/27/2022   TSH 0.40 - 4.00 mU/L 1.62   T4 5.0 - 11.0 ug/dL    FREE T4 0.76 - 1.46 ng/dL 0.92     TG/TPO:    All pertinent notes, labs, and images personally reviewed by me.     A/P  Ms.Nichole SAMARA Wise is a 52 year old here for the evaluation of hypothyroidism:    #1 Hypothyroidism (+TPO):  Currently on levothyroxine 88 mcg/day.  Takes generic.  Reports compliance.  Since starting medication she has noticed significant improvement.  Energy levels improved as well.  + wt gain since stopping smoking.  Plan:  Discussed diagnosis, pathophysiology, management and treatment options of condition with pt.  Based on 9/2022 labs-Thyroid labs are in acceptable range.  Continue current dose of thyroid hormone replacement-- levothyroxine 88 mcg/day.  Labs in 6 months or sooner if concerns.  Please make a lab appointment for blood work and follow up clinic appointment in 1 week after that to discuss results.    Discussed s/s of hypothyroidism and hyperthyroidism to watch for.  The patient indicates understanding of these issues and agrees with the plan.      Follow-up:  6 months.    Karol Price MD  Endocrinology  [facility]/[place]  CC: Tatiana Oseguera      All questions were answered.  The patient indicates understanding of the above issues and agrees with the plan set forth.     Answers for HPI/ROS submitted by the patient on 12/13/2022  General Symptoms: Yes  Skin Symptoms: No  HENT Symptoms: No  EYE SYMPTOMS: Yes  HEART SYMPTOMS: No  LUNG SYMPTOMS: No  INTESTINAL SYMPTOMS: Yes  URINARY SYMPTOMS: No  GYNECOLOGIC SYMPTOMS: No  BREAST SYMPTOMS: No  SKELETAL SYMPTOMS: Yes  BLOOD SYMPTOMS: No  NERVOUS SYSTEM SYMPTOMS: Yes  MENTAL HEALTH SYMPTOMS: Yes  Fever: No  Loss of appetite: No  Weight loss: No  Weight gain: Yes  Fatigue: Yes  Night sweats: Yes  Increased stress: Yes  Excessive hunger: No  Excessive thirst: No  Feeling hot or cold  when others believe the temperature is normal: Yes  Loss of height: No  Post-operative complications: No  Surgical site pain: No  Hallucinations: No  Change in or Loss of Energy: No  Hyperactivity: No  Confusion: No  Eye pain: No  Vision loss: No  Dry eyes: No  Watery eyes: No  Eye bulging: No  Double vision: Yes  Flashing of lights: No  Spots: No  Floaters: Yes  Redness: No  Crossed eyes: No  Tunnel Vision: No  Yellowing of eyes: No  Eye irritation: No  Heart burn or indigestion: Yes  Nausea: Yes  Vomiting: No  Abdominal pain: Yes  Bloating: Yes  Constipation: Yes  Diarrhea: Yes  Blood in stool: No  Black stools: No  Rectal or Anal pain: No  Fecal incontinence: No  Yellowing of skin or eyes: No  Vomit with blood: No  Change in stools: No  Back pain: Yes  Muscle aches: Yes  Neck pain: Yes  Swollen joints: Yes  Joint pain: Yes  Bone pain: Yes  Muscle cramps: Yes  Muscle weakness: No  Joint stiffness: Yes  Bone fracture: No  Trouble with coordination: No  Dizziness or trouble with balance: No  Fainting or black-out spells: No  Memory loss: No  Headache: Yes  Seizures: No  Speech problems: No  Tingling: Yes  Tremor: No  Weakness: No  Difficulty walking: No  Paralysis: No  Numbness: Yes  Nervous or Anxious: Yes  Depression: No  Trouble sleeping: Yes  Trouble thinking or concentrating: No  Mood changes: No  Panic attacks: No        Valeri Wise  is being evaluated via a billable video visit.      How would you like to obtain your AVS? MyChart  For the video visit, send the invitation by: Send to e-mail at: [email or web address]  Will anyone else be joining your video visit? No            Again, thank you for allowing me to participate in the care of your patient.        Sincerely,        Karol Price MD

## 2022-12-13 NOTE — NURSING NOTE
Patient denies any changes since echeck-in regarding medication and allergies and states all information entered during echeck-in remains accurate.    Aixa Callahan MA

## 2022-12-13 NOTE — PATIENT INSTRUCTIONS
[facility]  Dr Price, Endocrinology Department    [place] [id number]  [place] [address]  Appointment [phone]  Fax: [phone]  [place]: Monday - Thursday      Thyroid labs are in acceptable range.  Continue current dose of thyroid hormone replacement.  Labs in 6 months or sooner if concerns.  Please make a lab appointment for blood work and follow up clinic appointment in 1 week after that to discuss results.    Take Levothyroxine on an empty stomach. Take it with a full glass of water at least 30 minutes to 1 hour before eating breakfast.   This medicine should be taken at least 4 hours before or 4 hours after these medicines: antacids (Maalox , Mylanta , Tums ), calcium supplements, cholestyramine (Prevalite , Questran ), colestipol (Colestid ), iron supplements, orlistat (Ace , Xenical ), simethicone (Gas-X , Mylicon ), and sucralfate (Carafate ).   Swallow the capsule whole. Do not cut or crush it.

## 2022-12-13 NOTE — PROGRESS NOTES
"THIS IS A VIDEO VISIT:    Phone call visit/virtual visit encounter:    Name of patient: Valeri Wise    Date of encounter: 12/13/2022    Time of start of video visit: 4:00    Video started: 4:13    Video ended: 4:21    Provider location: working from [facility]/ [facility]    Patient location: patients home.    Mode of transmission: video/ Doximity    Verbal consent: obtained before starting visit. Pt is agreeable.      The patient has been notified of following:      \"This VIDEO visit will be conducted via a call between you and your physician/provider. We have found that certain health care needs can be provided without the need for a physical exam.  This service lets us provide the care you need with a short phone conversation.  If a prescription is necessary we can send it directly to your pharmacy.  If lab work is needed we can place an order for that and you can then stop by our lab to have the test done at a later time.     With new updates with corona virus patient might be billed as clinic visit.     If during the course of the call the physician/provider feels a telephone visit is not appropriate, you will not be charged for this service.\"      Past medical history, social history, family history, allergy and medications were reviewed and updated as appropriate.  Reviewed pertinent labs, notes, imaging studies personally.    ENDOCRINOLOGY CLINIC NOTE:  Name: Valeri Wise  Seen or f/u of  Hypothyroidism.  HPI:  Valeri Wise is a 52 year old female who presents for the evaluation of above.   has a past medical history of Disc degeneration, Hashimoto's thyroiditis (5/23/2022), and Uncomplicated asthma.    #1. Hypothyroidism (TPO positive):  Diagnosed in 4/2022.  She was having HA and had body aches and tingling as well as was fatigued so thyroid labs were checked which showed TSH 7.78, low FT and +TPO.  Also noted to have low vit B12.  She was started on levothyroxine 100 mcg/day in 4/2022.    Currently " taking levothyroxine 88 mcg/day. (on this dose X 2022)  F/u labs as noted below.  Takes generic.  Reports compliance.    + muscle pain.  Quit smoking 6 weeks back.  + wt gain since then 5 lbs    Palpitations:  No  Changes to hair or skin: No  Diarrhea/Constipation:Yes: + chronic constipation  Changes in menses: Yes: h/o hysterectomy  Changes in vision:No  Dysphagia or Shortness of breath:No  Muscle aches or pain:Yes: + muscle pain  Tremors: no  Changes in weight: as noted above  Heat or cold intolerance:better  History of Lithium or Amiodarone use:No  Head or neck surgery/radiation:No  Family History of Thyroid Problems: Sister with hypothyroidism.  Wt Readings from Last 2 Encounters:   22 68.1 kg (150 lb 3.2 oz)   22 73.3 kg (161 lb 11.2 oz)     PMH/PSH:  Past Medical History:   Diagnosis Date     Disc degeneration     neck     Hashimoto's thyroiditis 2022     Uncomplicated asthma      Past Surgical History:   Procedure Laterality Date     [facility] NONSPECIFIC PROCEDURE      Jaw surgery--corrective     [facility] NONSPECIFIC PROCEDURE           [facility] VAGINAL HYSTERECTOMY  10/2004     Family Hx:  Family History   Problem Relation Age of Onset     Heart Disease Maternal Grandfather      Cancer Maternal Grandfather         leukemia     Diabetes Maternal Grandfather      Coronary Artery Disease Maternal Grandfather      C.A.D. Maternal Grandmother      Other Cancer Maternal Grandmother         Leukemia     Breast Cancer No family hx of      Cancer - colorectal No family hx of      Social Hx:  Social History     Socioeconomic History     Marital status:      Spouse name: Not on file     Number of children: Not on file     Years of education: Not on file     Highest education level: Not on file   Occupational History     Not on file   Tobacco Use     Smoking status: Every Day     Packs/day: 0.50     Years: 5.00     Pack years: 2.50     Types: Cigarettes     Start date: 2021     Smokeless  tobacco: Never     Tobacco comments:     1.5 PER WEEK    Vaping Use     Vaping Use: Never used   Substance and Sexual Activity     Alcohol use: Yes     Comment: Once or twice a week     Drug use: No     Sexual activity: Yes     Partners: Male     Birth control/protection: Female Surgical     Comment: 2004 Vaginal Hysterectomy   Other Topics Concern     Parent/sibling w/ CABG, MI or angioplasty before 65F 55M? No   Social History Narrative     Not on file     Social Determinants of Health     Financial Resource Strain: Not on file   Food Insecurity: Not on file   Transportation Needs: Not on file   Physical Activity: Not on file   Stress: Not on file   Social Connections: Not on file   Intimate Partner Violence: Not on file   Housing Stability: Not on file          MEDICATIONS:  has a current medication list which includes the following prescription(s): citalopram, gabapentin, and levothyroxine.    ROS   ROS: 10 point ROS neg other than the symptoms noted above in the HPI.    Physical Exam   VS: LMP 09/22/2004   GENERAL: healthy, alert and no distress  EYES: Eyes grossly normal to inspection, conjunctivae and sclerae normal  ENT: no nose swelling, nasal discharge.  Thyroid: no apparent thyroid nodules  RESP: no audible wheeze, cough, or visible cyanosis.  No visible retractions or increased work of breathing.  Able to speak fully in complete sentences.  ABDO: not evaluated.  EXTREMITIES: no hand tremors.  NEURO: Cranial nerves grossly intact, mentation intact and speech normal  SKIN: No apparent skin lesions, rash or edema seen   PSYCH: mentation appears normal, affect normal/bright, judgement and insight intact, normal speech and appearance well-groomed    LABS:  TFTs:  ENDO THYROID LABS-[facility] Latest Ref Rng & Units 9/27/2022   TSH 0.40 - 4.00 mU/L 1.62   T4 5.0 - 11.0 ug/dL    FREE T4 0.76 - 1.46 ng/dL 0.92     TG/TPO:    All pertinent notes, labs, and images personally reviewed by me.     A/P  Ms.Nichole SAMARA Wise is a  52 year old here for the evaluation of hypothyroidism:    #1 Hypothyroidism (+TPO):  Currently on levothyroxine 88 mcg/day.  Takes generic.  Reports compliance.  Since starting medication she has noticed significant improvement.  Energy levels improved as well.  + wt gain since stopping smoking.  Plan:  Discussed diagnosis, pathophysiology, management and treatment options of condition with pt.  Based on 9/2022 labs-Thyroid labs are in acceptable range.  Continue current dose of thyroid hormone replacement-- levothyroxine 88 mcg/day.  Labs in 6 months or sooner if concerns.  Please make a lab appointment for blood work and follow up clinic appointment in 1 week after that to discuss results.    Discussed s/s of hypothyroidism and hyperthyroidism to watch for.  The patient indicates understanding of these issues and agrees with the plan.      Follow-up:  6 months.    Karol Price MD  Endocrinology  [facility]/[place]  CC: Taitana Oseguera      All questions were answered.  The patient indicates understanding of the above issues and agrees with the plan set forth.     Answers for HPI/ROS submitted by the patient on 12/13/2022  General Symptoms: Yes  Skin Symptoms: No  HENT Symptoms: No  EYE SYMPTOMS: Yes  HEART SYMPTOMS: No  LUNG SYMPTOMS: No  INTESTINAL SYMPTOMS: Yes  URINARY SYMPTOMS: No  GYNECOLOGIC SYMPTOMS: No  BREAST SYMPTOMS: No  SKELETAL SYMPTOMS: Yes  BLOOD SYMPTOMS: No  NERVOUS SYSTEM SYMPTOMS: Yes  MENTAL HEALTH SYMPTOMS: Yes  Fever: No  Loss of appetite: No  Weight loss: No  Weight gain: Yes  Fatigue: Yes  Night sweats: Yes  Increased stress: Yes  Excessive hunger: No  Excessive thirst: No  Feeling hot or cold when others believe the temperature is normal: Yes  Loss of height: No  Post-operative complications: No  Surgical site pain: No  Hallucinations: No  Change in or Loss of Energy: No  Hyperactivity: No  Confusion: No  Eye pain: No  Vision loss: No  Dry eyes: No  Watery eyes: No  Eye  bulging: No  Double vision: Yes  Flashing of lights: No  Spots: No  Floaters: Yes  Redness: No  Crossed eyes: No  Tunnel Vision: No  Yellowing of eyes: No  Eye irritation: No  Heart burn or indigestion: Yes  Nausea: Yes  Vomiting: No  Abdominal pain: Yes  Bloating: Yes  Constipation: Yes  Diarrhea: Yes  Blood in stool: No  Black stools: No  Rectal or Anal pain: No  Fecal incontinence: No  Yellowing of skin or eyes: No  Vomit with blood: No  Change in stools: No  Back pain: Yes  Muscle aches: Yes  Neck pain: Yes  Swollen joints: Yes  Joint pain: Yes  Bone pain: Yes  Muscle cramps: Yes  Muscle weakness: No  Joint stiffness: Yes  Bone fracture: No  Trouble with coordination: No  Dizziness or trouble with balance: No  Fainting or black-out spells: No  Memory loss: No  Headache: Yes  Seizures: No  Speech problems: No  Tingling: Yes  Tremor: No  Weakness: No  Difficulty walking: No  Paralysis: No  Numbness: Yes  Nervous or Anxious: Yes  Depression: No  Trouble sleeping: Yes  Trouble thinking or concentrating: No  Mood changes: No  Panic attacks: No

## 2022-12-16 ENCOUNTER — TELEPHONE (OUTPATIENT)
Dept: ENDOCRINOLOGY | Facility: CLINIC | Age: 52
End: 2022-12-16

## 2022-12-16 NOTE — TELEPHONE ENCOUNTER
Attempted to reach patient to schedule follow up in the Endocrinology Clinic. No answer, LM on VM to call office back.    Schedule with Karol Price MD in 6 months and labs 1 week prior.      Shayla Vang, VF

## 2023-04-09 DIAGNOSIS — F41.9 ANXIETY: ICD-10-CM

## 2023-04-11 RX ORDER — CITALOPRAM HYDROBROMIDE 20 MG/1
TABLET ORAL
Qty: 90 TABLET | Refills: 0 | Status: SHIPPED | OUTPATIENT
Start: 2023-04-11 | End: 2023-07-14

## 2023-04-11 NOTE — TELEPHONE ENCOUNTER
Medication is being filled for 1 time refill only due to:  Patient needs to be seen because she is due for an appt..   EDU Hoffman RN

## 2023-06-01 ENCOUNTER — HEALTH MAINTENANCE LETTER (OUTPATIENT)
Age: 53
End: 2023-06-01

## 2023-06-12 ENCOUNTER — TELEPHONE (OUTPATIENT)
Dept: ENDOCRINOLOGY | Facility: CLINIC | Age: 53
End: 2023-06-12
Payer: COMMERCIAL

## 2023-06-12 DIAGNOSIS — R76.8 ANTI-TPO ANTIBODIES PRESENT: ICD-10-CM

## 2023-06-12 DIAGNOSIS — E06.3 HASHIMOTO'S THYROIDITIS: ICD-10-CM

## 2023-06-12 DIAGNOSIS — E03.9 HYPOTHYROIDISM, UNSPECIFIED TYPE: ICD-10-CM

## 2023-06-12 NOTE — TELEPHONE ENCOUNTER
[facility] Call Center    Phone Message    May a detailed message be left on voicemail: yes     Reason for Call: Medication Refill Request    Has the patient contacted the pharmacy for the refill? Yes   Name of medication being requested: levothyroxine (SYNTHROID/LEVOTHROID) 88 MCG tablet [68300] (Order [id number])    Provider who prescribed the medication: Karol Price MD  Pharmacy: [facility]/PHARMACY #[id number] [place] [address] [address]  Date medication is needed: next 30 days         Action Taken: Other: endo    Travel Screening: Not Applicable

## 2023-06-12 NOTE — TELEPHONE ENCOUNTER
Pt needs labs now.    Per notes in December:  Continue current dose of thyroid hormone replacement-- levothyroxine 88 mcg/day.  Labs in 6 months or sooner if concerns.  Please make a lab appointment for blood work and follow up clinic appointment in 1 week after that to discuss results.

## 2023-06-15 ENCOUNTER — LAB (OUTPATIENT)
Dept: LAB | Facility: CLINIC | Age: 53
End: 2023-06-15
Payer: COMMERCIAL

## 2023-06-15 DIAGNOSIS — E06.3 HASHIMOTO'S THYROIDITIS: ICD-10-CM

## 2023-06-15 LAB
T4 FREE SERPL-MCNC: 1.19 NG/DL (ref 0.9–1.7)
TSH SERPL DL<=0.005 MIU/L-ACNC: 2.54 UIU/ML (ref 0.3–4.2)

## 2023-06-15 PROCEDURE — 84439 ASSAY OF FREE THYROXINE: CPT

## 2023-06-15 PROCEDURE — 36415 COLL VENOUS BLD VENIPUNCTURE: CPT

## 2023-06-15 PROCEDURE — 84443 ASSAY THYROID STIM HORMONE: CPT

## 2023-06-16 RX ORDER — LEVOTHYROXINE SODIUM 88 UG/1
88 TABLET ORAL DAILY
Qty: 90 TABLET | Refills: 0 | Status: SHIPPED | OUTPATIENT
Start: 2023-06-16 | End: 2023-10-13

## 2023-07-20 ENCOUNTER — OFFICE VISIT (OUTPATIENT)
Dept: FAMILY MEDICINE | Facility: CLINIC | Age: 53
End: 2023-07-20
Payer: COMMERCIAL

## 2023-07-20 VITALS
TEMPERATURE: 98 F | WEIGHT: 166.9 LBS | SYSTOLIC BLOOD PRESSURE: 110 MMHG | BODY MASS INDEX: 28.49 KG/M2 | RESPIRATION RATE: 14 BRPM | OXYGEN SATURATION: 98 % | HEART RATE: 81 BPM | DIASTOLIC BLOOD PRESSURE: 74 MMHG | HEIGHT: 64 IN

## 2023-07-20 DIAGNOSIS — Z72.0 TOBACCO ABUSE DISORDER: ICD-10-CM

## 2023-07-20 DIAGNOSIS — F41.9 ANXIETY: ICD-10-CM

## 2023-07-20 DIAGNOSIS — E78.5 HYPERLIPIDEMIA LDL GOAL <160: ICD-10-CM

## 2023-07-20 DIAGNOSIS — M50.30 DDD (DEGENERATIVE DISC DISEASE), CERVICAL: ICD-10-CM

## 2023-07-20 DIAGNOSIS — M54.2 CERVICALGIA: ICD-10-CM

## 2023-07-20 DIAGNOSIS — Z00.00 ROUTINE GENERAL MEDICAL EXAMINATION AT A HEALTH CARE FACILITY: Primary | ICD-10-CM

## 2023-07-20 DIAGNOSIS — Z12.31 VISIT FOR SCREENING MAMMOGRAM: ICD-10-CM

## 2023-07-20 DIAGNOSIS — Z12.11 SCREEN FOR COLON CANCER: ICD-10-CM

## 2023-07-20 DIAGNOSIS — E06.3 HASHIMOTO'S THYROIDITIS: ICD-10-CM

## 2023-07-20 PROCEDURE — 99214 OFFICE O/P EST MOD 30 MIN: CPT | Mod: 25 | Performed by: INTERNAL MEDICINE

## 2023-07-20 PROCEDURE — 99396 PREV VISIT EST AGE 40-64: CPT | Performed by: INTERNAL MEDICINE

## 2023-07-20 RX ORDER — CITALOPRAM HYDROBROMIDE 20 MG/1
20 TABLET ORAL DAILY
Qty: 90 TABLET | Refills: 3 | Status: SHIPPED | OUTPATIENT
Start: 2023-07-20 | End: 2024-08-22

## 2023-07-20 RX ORDER — GABAPENTIN 300 MG/1
300 CAPSULE ORAL 3 TIMES DAILY
Qty: 270 CAPSULE | Refills: 1 | Status: SHIPPED | OUTPATIENT
Start: 2023-07-20 | End: 2024-08-22

## 2023-07-20 ASSESSMENT — ENCOUNTER SYMPTOMS
FREQUENCY: 0
ABDOMINAL PAIN: 1
COUGH: 0
FEVER: 0
EYE PAIN: 0
SHORTNESS OF BREATH: 1
HEMATOCHEZIA: 0
DIZZINESS: 1
WEAKNESS: 0
SORE THROAT: 0
ARTHRALGIAS: 1
PARESTHESIAS: 0
CHILLS: 0
JOINT SWELLING: 1
DYSURIA: 0
NERVOUS/ANXIOUS: 1
MYALGIAS: 1
HEARTBURN: 0
CONSTIPATION: 1
BREAST MASS: 0
HEMATURIA: 0
DIARRHEA: 0
PALPITATIONS: 0
HEADACHES: 0

## 2023-07-20 ASSESSMENT — ANXIETY QUESTIONNAIRES
GAD7 TOTAL SCORE: 6
3. WORRYING TOO MUCH ABOUT DIFFERENT THINGS: SEVERAL DAYS
7. FEELING AFRAID AS IF SOMETHING AWFUL MIGHT HAPPEN: NOT AT ALL
GAD7 TOTAL SCORE: 6
6. BECOMING EASILY ANNOYED OR IRRITABLE: SEVERAL DAYS
2. NOT BEING ABLE TO STOP OR CONTROL WORRYING: SEVERAL DAYS
1. FEELING NERVOUS, ANXIOUS, OR ON EDGE: SEVERAL DAYS
IF YOU CHECKED OFF ANY PROBLEMS ON THIS QUESTIONNAIRE, HOW DIFFICULT HAVE THESE PROBLEMS MADE IT FOR YOU TO DO YOUR WORK, TAKE CARE OF THINGS AT HOME, OR GET ALONG WITH OTHER PEOPLE: SOMEWHAT DIFFICULT
5. BEING SO RESTLESS THAT IT IS HARD TO SIT STILL: SEVERAL DAYS

## 2023-07-20 ASSESSMENT — PAIN SCALES - GENERAL: PAINLEVEL: MODERATE PAIN (5)

## 2023-07-20 ASSESSMENT — PATIENT HEALTH QUESTIONNAIRE - PHQ9: 5. POOR APPETITE OR OVEREATING: SEVERAL DAYS

## 2023-07-20 NOTE — PROGRESS NOTES
Chief Complaint   Patient presents with     Physical     Anxiety     Last  Visit with PCP: 3/29/2021     SUBJECTIVE:   CC: Valeri is an 53 year old who presents for preventive health visit.       7/20/2023     2:18 PM   Additional Questions   Roomed by Amanda MARIE LPN     Healthy Habits:     Getting at least 3 servings of Calcium per day:  Yes    Bi-annual eye exam:  NO    Dental care twice a year:  NO    Sleep apnea or symptoms of sleep apnea:  Daytime drowsiness and Excessive snoring    Diet:  Gluten-free/reduced    Frequency of exercise:  1 day/week    Duration of exercise:  15-30 minutes    Taking medications regularly:  Yes    Additional concerns today:  Yes  Anxiety  Associated symptoms include abdominal pain, arthralgias, congestion, joint swelling and myalgias. Pertinent negatives include no chest pain, chills, coughing, fever, headaches, rash, sore throat or weakness.       Today's PHQ-2 Score:       7/20/2023     2:04 PM   PHQ-2 ( 1999 [employer])   Q1: Little interest or pleasure in doing things 1   Q2: Feeling down, depressed or hopeless 1   PHQ-2 Score 2   Q1: Little interest or pleasure in doing things Several days   Q2: Feeling down, depressed or hopeless Several days   PHQ-2 Score 2         Anxiety Follow-Up    How are you doing with your anxiety since your last visit? No change    Are you having other symptoms that might be associated with anxiety? No    Have you had a significant life event? No     Are you feeling depressed? Yes:  some days where she is not motivated.  her joint pain affects that part of her emotions     Do you have any concerns with your use of alcohol or other drugs? No    Social History     Tobacco Use     Smoking status: Every Day     Packs/day: 0.50     Years: 5.00     Pack years: 2.50     Types: Cigarettes     Start date: 4/6/2021     Smokeless tobacco: Never     Tobacco comments:     1.5 PER WEEK    Vaping Use     Vaping Use: Never used   Substance Use Topics     Alcohol use:  Yes     Comment: Once or twice a week     Drug use: No         1/20/2020    12:20 PM 3/29/2021    12:38 PM 7/20/2023     2:27 PM   ALIYA-7 SCORE   Total Score 7 2 6         3/29/2021    12:38 PM 4/6/2022    10:44 AM 5/22/2022    10:22 PM   PHQ   PHQ-9 Total Score 3 11 4   Q9: Thoughts of better off dead/self-harm past 2 weeks Not at all Not at all Not at all         7/20/2023     2:27 PM   ALIYA-7    1. Feeling nervous, anxious, or on edge 1   2. Not being able to stop or control worrying 1   3. Worrying too much about different things 1   4. Trouble relaxing 1   5. Being so restless that it is hard to sit still 1   6. Becoming easily annoyed or irritable 1   7. Feeling afraid, as if something awful might happen 0   ALIYA-7 Total Score 6   If you checked any problems, how difficult have they made it for you to do your work, take care of things at home, or get along with other people? Somewhat difficult       Have you ever done Advance Care Planning? (For example, a Health Directive, POLST, or a discussion with a medical provider or your loved ones about your wishes): No, advance care planning information given to patient to review.  Patient declined advance care planning discussion at this time.    Social History     Tobacco Use     Smoking status: Every Day     Packs/day: 0.50     Years: 5.00     Pack years: 2.50     Types: Cigarettes     Start date: 4/6/2021     Smokeless tobacco: Never     Tobacco comments:     1.5 PER WEEK    Substance Use Topics     Alcohol use: Yes     Comment: Once or twice a week             7/20/2023     2:03 PM   Alcohol Use   Prescreen: >3 drinks/day or >7 drinks/week? No     Reviewed orders with patient.  Reviewed health maintenance and updated orders accordingly - Yes  Labs reviewed in EPIC  BP Readings from Last 3 Encounters:   07/20/23 110/74   09/02/22 (!) 130/92   04/06/22 (!) 146/90    Wt Readings from Last 3 Encounters:   07/20/23 75.7 kg (166 lb 14.4 oz)   09/02/22 68.1 kg (150 lb 3.2  oz)   22 73.3 kg (161 lb 11.2 oz)                  Patient Active Problem List   Diagnosis     Disturbance in sleep behavior     HYPERLIPIDEMIA LDL GOAL <160     DDD (degenerative disc disease), cervical     Seasonal allergic rhinitis     Anxiety     Cystocele, unspecified cystocele location     BELLE II (cervical intraepithelial neoplasia II)     Hashimoto's thyroiditis     Hypothyroidism, unspecified type     Anti-TPO antibodies present     Chronic left shoulder pain     Past Surgical History:   Procedure Laterality Date     [facility] NONSPECIFIC PROCEDURE      Jaw surgery--corrective     [facility] NONSPECIFIC PROCEDURE           [facility] VAGINAL HYSTERECTOMY  10/2004       Social History     Tobacco Use     Smoking status: Every Day     Packs/day: 0.50     Years: 5.00     Pack years: 2.50     Types: Cigarettes     Start date: 2021     Smokeless tobacco: Never     Tobacco comments:     1.5 PER WEEK    Substance Use Topics     Alcohol use: Yes     Comment: Once or twice a week     Family History   Problem Relation Age of Onset     Heart Disease Maternal Grandfather      Cancer Maternal Grandfather         leukemia     Diabetes Maternal Grandfather      Coronary Artery Disease Maternal Grandfather      C.A.D. Maternal Grandmother      Other Cancer Maternal Grandmother         Leukemia     Breast Cancer No family hx of      Cancer - colorectal No family hx of          Current Outpatient Medications   Medication Sig Dispense Refill     citalopram (CELEXA) 20 MG tablet Take 1 tablet (20 mg) by mouth daily 90 tablet 3     gabapentin (NEURONTIN) 300 MG capsule Take 1 capsule (300 mg) by mouth 3 times daily 270 capsule 1     levothyroxine (SYNTHROID/LEVOTHROID) 88 MCG tablet Take 1 tablet (88 mcg) by mouth daily 90 tablet 0     Allergies   Allergen Reactions     Sulfa Antibiotics      hives     Recent Labs   Lab Test 06/15/23  1147 22  1551 22  1520 22  1136 11/11/15  0000   ALT  --   --   --  32 25    CR  --   --   --  0.92 0.90   GFRESTIMATED  --   --   --  74 >60   GFRESTBLACK  --   --   --   --  >60   POTASSIUM  --   --   --  4.4 4.0   TSH 2.54 1.62   < > 7.78* 4.180    < > = values in this interval not displayed.        Breast Cancer Screenin/20/2023     2:04 PM   Breast CA Risk Assessment (FHS-7)   Do you have a family history of breast, colon, or ovarian cancer? No / Unknown         Mammogram Screening: Recommended annual mammography  Pertinent mammograms are reviewed under the imaging tab.    History of abnormal Pap smear: Status post benign hysterectomy. Health Maintenance and Surgical History updated.      Latest Ref Rng & Units 2021     1:24 PM 2021     1:15 PM 3/28/2017     7:51 PM   PAP / HPV   PAP (Historical)  NIL      HPV 16 DNA NEG^Negative  Negative  Negative    HPV 18 DNA NEG^Negative  Negative  Negative    Other HR HPV NEG^Negative  Negative  Negative      Reviewed and updated as needed this visit by clinical staff   Tobacco  Allergies  Meds  Problems  Med Hx  Surg Hx  Fam Hx          Reviewed and updated as needed this visit by Provider   Tobacco  Allergies  Meds  Problems  Med Hx  Surg Hx  Fam Hx         Past Medical History:   Diagnosis Date     Disc degeneration     neck     Hashimoto's thyroiditis 2022     Uncomplicated asthma       Past Surgical History:   Procedure Laterality Date     [facility] NONSPECIFIC PROCEDURE      Jaw surgery--corrective     [facility] NONSPECIFIC PROCEDURE           [facility] VAGINAL HYSTERECTOMY  10/2004       Review of Systems   Constitutional: Negative for chills and fever.   HENT: Positive for congestion. Negative for ear pain, hearing loss and sore throat.    Eyes: Negative for pain and visual disturbance.   Respiratory: Positive for shortness of breath. Negative for cough.    Cardiovascular: Negative for chest pain, palpitations and peripheral edema.   Gastrointestinal: Positive for abdominal pain and constipation.  "Negative for diarrhea, heartburn and hematochezia.   Breasts:  Negative for tenderness, breast mass and discharge.   Genitourinary: Negative for dysuria, frequency, genital sores, hematuria, pelvic pain, urgency, vaginal bleeding and vaginal discharge.   Musculoskeletal: Positive for arthralgias, joint swelling and myalgias.   Skin: Negative for rash.   Neurological: Positive for dizziness. Negative for weakness, headaches and paresthesias.   Psychiatric/Behavioral: Positive for mood changes. The patient is nervous/anxious.         OBJECTIVE:   /74   Pulse 81   Temp 98  F (36.7  C) (Oral)   Resp 14   Ht 1.632 m (5' 4.25\")   Wt 75.7 kg (166 lb 14.4 oz)   LMP 09/22/2004   SpO2 98%   BMI 28.43 kg/m    Physical Exam  GENERAL: healthy, alert and no distress  EYES: Eyes grossly normal to inspection  NECK: no adenopathy, no asymmetry, masses, or scars and thyroid normal to palpation  RESP: lungs clear to auscultation - no rales, rhonchi or wheezes  BREAST: normal without masses, tenderness or nipple discharge and no palpable axillary masses or adenopathy  CV: regular rate and rhythm, normal S1 S2, no S3 or S4, no murmur, click or rub, no peripheral edema and peripheral pulses strong  ABDOMEN: soft, nontender, no hepatosplenomegaly, no masses and bowel sounds normal  MS: no gross musculoskeletal defects noted, no edema  NEURO: Normal strength and tone, mentation intact and speech normal  PSYCH: mentation appears normal, affect normal/bright    Diagnostic Test Results:  Labs reviewed in Epic              ASSESSMENT/PLAN:   (Z00.00) Routine general medical examination at a health care facility  (primary encounter diagnosis)  Comment: HEALTH CARE MAINTENANCE reviewed  Lab Results   Component Value Date    PAP NIL 06/29/2021     Plan: colon screening- colonoscopy    (F41.9) Anxiety  Comment:Feeling well on  Citalopram 20 mg  Plan: citalopram (CELEXA) 20 MG tablet          (Z12.11) Screen for colon cancer  Comment: " "colon screening due; she would like it done in [place] d with Dr. Aixa Caputo, General Surgery.  Plan: Colonoscopy Screening  Referral          (Z12.31) Visit for screening mammogram  Comment: Clinical Breast Exam done  Plan: MA SCREENING DIGITAL BILAT - Future  (s+30)          (E78.5) Hyperlipidemia LDL goal <160  Comment: No results for input(s): CHOL, HDL, LDL, TRIG, CHOLHDLRATIO in the last 32929 hours.  Plan: Lipid panel reflex to direct LDL Non-fasting,         Comprehensive metabolic panel (BMP + Alb, Alk         Phos, ALT, AST, Total. Bili, TP)          (E06.3) Hashimoto's thyroiditis  Comment: she follows with Endocrine- Dr. Price   Plan: Levothyroxine per Dr. Price    (M54.2) Cervicalgia  Comment: Cervical DDD; saw Neuro (CHASIDY)and had MRI 2007;  severe changes at C5-C6; evaluated in 2007, many rheum type labs done 2008; no dx.  She is wondering if there are other treatment options. Feels achy in arms and legs; recommend spine referral ; other treatment options for severe Cervicalgia.   Plan: gabapentin (NEURONTIN) 300 MG capsule, Spine  Referral          (M50.30) DDD (degenerative disc disease), cervical  Comment: Cervical DDD; saw Neuro (CHASIDY)and had MRI 2007;  severe changes at C5-C6; evaluated in 2007, many rheum type labs done 2008; no dx.  She is wondering if there are other treatment options. Feels achy in arms and legs; recommend spine referral ; other treatment options for severe Cervicalgia.   Plan: gabapentin (NEURONTIN) 300 MG capsule, Spine  Referral            Patient has been advised of split billing requirements and indicates understanding: Yes      COUNSELING:  Reviewed preventive health counseling, as reflected in patient instructions       Regular exercise       Healthy diet/nutrition      BMI:   Estimated body mass index is 28.43 kg/m  as calculated from the following:    Height as of this encounter: 1.632 m (5' 4.25\").    Weight as of this " encounter: 75.7 kg (166 lb 14.4 oz).   Weight management plan: Discussed healthy diet and exercise guidelines      She reports that she has been smoking. She started smoking about 2 years ago. She has a 2.50 pack-year smoking history. She has never used smokeless tobacco.  Nicotine/Tobacco Cessation Plan:   on line resources are available if desired.           Tatiana Oseguera MD  Internal Medicine   [facility]    30 minutes in addition to HEALTH CARE MAINTENANCE are spent with patient, over 50% of that time spent providing counselling, discussing and reviewing medical conditions/concerns, meds and potential side effects.

## 2023-07-21 ENCOUNTER — ANCILLARY PROCEDURE (OUTPATIENT)
Dept: MAMMOGRAPHY | Facility: CLINIC | Age: 53
End: 2023-07-21
Payer: COMMERCIAL

## 2023-07-21 DIAGNOSIS — Z12.31 VISIT FOR SCREENING MAMMOGRAM: ICD-10-CM

## 2023-07-21 PROCEDURE — 77067 SCR MAMMO BI INCL CAD: CPT | Mod: TC | Performed by: RADIOLOGY

## 2023-10-13 DIAGNOSIS — R76.8 ANTI-TPO ANTIBODIES PRESENT: ICD-10-CM

## 2023-10-13 DIAGNOSIS — E03.9 HYPOTHYROIDISM, UNSPECIFIED TYPE: ICD-10-CM

## 2023-10-13 DIAGNOSIS — E06.3 HASHIMOTO'S THYROIDITIS: ICD-10-CM

## 2023-10-13 RX ORDER — LEVOTHYROXINE SODIUM 88 UG/1
88 TABLET ORAL DAILY
Qty: 30 TABLET | Refills: 0 | Status: SHIPPED | OUTPATIENT
Start: 2023-10-13 | End: 2023-10-24

## 2023-10-13 NOTE — TELEPHONE ENCOUNTER
"Requested Prescriptions   Pending Prescriptions Disp Refills    levothyroxine (SYNTHROID/LEVOTHROID) 88 MCG tablet [Pharmacy Med Name: LEVOTHYROXINE 88 MCG TABLET] 90 tablet 0     Sig: TAKE 1 TABLET BY MOUTH EVERY DAY       Thyroid Protocol Passed - 10/13/2023 12:31 PM        Passed - Patient is 12 years or older        Passed - Recent (12 mo) or future (30 days) visit within the authorizing provider's specialty     Patient has had an office visit with the authorizing provider or a provider within the authorizing providers department within the previous 12 mos or has a future within next 30 days. See \"Patient Info\" tab in inbasket, or \"Choose Columns\" in Meds & Orders section of the refill encounter.              Passed - Medication is active on med list        Passed - Normal TSH on file in past 12 months     Recent Labs   Lab Test 06/15/23  1147   TSH 2.54              Passed - No active pregnancy on record     If patient is pregnant or has had a positive pregnancy test, please check TSH.          Passed - No positive pregnancy test in past 12 months     If patient is pregnant or has had a positive pregnancy test, please check TSH.             Refilled for 30 day supply, f/up appt 10/24/23  "

## 2023-10-24 ENCOUNTER — VIRTUAL VISIT (OUTPATIENT)
Dept: ENDOCRINOLOGY | Facility: CLINIC | Age: 53
End: 2023-10-24
Payer: COMMERCIAL

## 2023-10-24 VITALS — BODY MASS INDEX: 28.27 KG/M2 | WEIGHT: 166 LBS

## 2023-10-24 DIAGNOSIS — R76.8 ANTI-TPO ANTIBODIES PRESENT: ICD-10-CM

## 2023-10-24 DIAGNOSIS — E03.9 HYPOTHYROIDISM, UNSPECIFIED TYPE: Primary | ICD-10-CM

## 2023-10-24 DIAGNOSIS — E06.3 HASHIMOTO'S THYROIDITIS: ICD-10-CM

## 2023-10-24 PROCEDURE — 99214 OFFICE O/P EST MOD 30 MIN: CPT | Mod: VID | Performed by: INTERNAL MEDICINE

## 2023-10-24 RX ORDER — LEVOTHYROXINE SODIUM 88 UG/1
88 TABLET ORAL DAILY
Qty: 90 TABLET | Refills: 3 | Status: SHIPPED | OUTPATIENT
Start: 2023-10-24 | End: 2024-08-22

## 2023-10-24 NOTE — PROGRESS NOTES
"THIS IS A VIDEO VISIT:    Phone call visit/virtual visit encounter:    Name of patient: Valeri Wise    Date of encounter: 12/13/2022    Time of start of video visit: 4:00    Video started: 4:13    Video ended: 4:21    Provider location: working from [place]/ [facility]    Patient location: patients home.    Mode of transmission: video/ Doximity    Verbal consent: obtained before starting visit. Pt is agreeable.      The patient has been notified of following:      \"This VIDEO visit will be conducted via a call between you and your physician/provider. We have found that certain health care needs can be provided without the need for a physical exam.  This service lets us provide the care you need with a short phone conversation.  If a prescription is necessary we can send it directly to your pharmacy.  If lab work is needed we can place an order for that and you can then stop by our lab to have the test done at a later time.     With new updates with corona virus patient might be billed as clinic visit.     If during the course of the call the physician/provider feels a telephone visit is not appropriate, you will not be charged for this service.\"      Past medical history, social history, family history, allergy and medications were reviewed and updated as appropriate.  Reviewed pertinent labs, notes, imaging studies personally.    ENDOCRINOLOGY CLINIC NOTE:  Name: Valeri Wise  Seen or f/u of  Hypothyroidism.  HPI:  Valeri Wise is a 52 year old female who presents for the evaluation of above.   has a past medical history of Disc degeneration, Hashimoto's thyroiditis (5/23/2022), and Uncomplicated asthma.    #1. Hypothyroidism (TPO positive):  Diagnosed in 4/2022.  She was having HA and had body aches and tingling as well as was fatigued so thyroid labs were checked which showed TSH 7.78, low FT and +TPO.  Also noted to have low vit B12.  She was started on levothyroxine 100 mcg/day in 4/2022.    Currently " taking levothyroxine 88 mcg/day. (on this dose X 2022)  F/u labs as noted below.  Takes generic.  Takes at night with rest of medications.  She needs coffee in AM so its difficult for her to take LT4 in AM.    + feels tired.  + feels cold  + wt gain    Palpitations:  No  Changes to hair or skin: No  Diarrhea/Constipation:Yes: + chronic constipation  Changes in menses: Yes: h/o hysterectomy  Changes in vision:No  Dysphagia or Shortness of breath:No  Tremors: + sometimes  Changes in weight: as noted above  Heat or cold intolerance:better  History of Lithium or Amiodarone use:No  Head or neck surgery/radiation:No  Family History of Thyroid Problems: Sister with hypothyroidism.  Wt Readings from Last 2 Encounters:   10/24/23 75.3 kg (166 lb)   23 75.7 kg (166 lb 14.4 oz)     PMH/PSH:  Past Medical History:   Diagnosis Date    Disc degeneration     neck    Hashimoto's thyroiditis 2022    Uncomplicated asthma      Past Surgical History:   Procedure Laterality Date    [facility] NONSPECIFIC PROCEDURE      Jaw surgery--corrective    [facility] NONSPECIFIC PROCEDURE          [facility] VAGINAL HYSTERECTOMY  10/2004     Family Hx:  Family History   Problem Relation Age of Onset    Heart Disease Maternal Grandfather     Cancer Maternal Grandfather         leukemia    Diabetes Maternal Grandfather     Coronary Artery Disease Maternal Grandfather     C.A.D. Maternal Grandmother     Other Cancer Maternal Grandmother         Leukemia    Breast Cancer No family hx of     Cancer - colorectal No family hx of      Social Hx:  Social History     Socioeconomic History    Marital status:      Spouse name: Not on file    Number of children: Not on file    Years of education: Not on file    Highest education level: Not on file   Occupational History    Not on file   Tobacco Use    Smoking status: Every Day     Packs/day: 0.50     Years: 5.00     Additional pack years: 0.00     Total pack years: 2.50     Types: Cigarettes      Start date: 4/6/2021    Smokeless tobacco: Never    Tobacco comments:     1.5 PER WEEK    Vaping Use    Vaping Use: Never used   Substance and Sexual Activity    Alcohol use: Yes     Comment: Once or twice a week    Drug use: No    Sexual activity: Yes     Partners: Male     Birth control/protection: Female Surgical     Comment: 2004 Vaginal Hysterectomy   Other Topics Concern    Parent/sibling w/ CABG, MI or angioplasty before 65F 55M? No   Social History Narrative    Not on file     Social Determinants of Health     Financial Resource Strain: Not on file   Food Insecurity: Not on file   Transportation Needs: Not on file   Physical Activity: Not on file   Stress: Not on file   Social Connections: Not on file   Interpersonal Safety: Not on file   Housing Stability: Not on file          MEDICATIONS:  has a current medication list which includes the following prescription(s): citalopram, gabapentin, and levothyroxine.    ROS   ROS: 10 point ROS neg other than the symptoms noted above in the HPI.    Physical Exam   VS: Wt 75.3 kg (166 lb)   LMP 09/22/2004   BMI 28.27 kg/m    GENERAL: healthy, alert and no distress  EYES: Eyes grossly normal to inspection, conjunctivae and sclerae normal  ENT: no nose swelling, nasal discharge.  Thyroid: no apparent thyroid nodules  RESP: no audible wheeze, cough, or visible cyanosis.  No visible retractions or increased work of breathing.  Able to speak fully in complete sentences.  ABDO: not evaluated.  EXTREMITIES: no hand tremors.  NEURO: Cranial nerves grossly intact, mentation intact and speech normal  SKIN: No apparent skin lesions, rash or edema seen   PSYCH: mentation appears normal, affect normal/bright, judgement and insight intact, normal speech and appearance well-groomed    LABS:  TFTs:   Latest Ref Rng 6/15/2023  11:47 AM   ENDO THYROID LABS-[facility]     TSH 0.30 - 4.20 uIU/mL 2.54    T4 5.0 - 11.0 ug/dL    FREE T4 0.90 - 1.70 ng/dL 1.19      TG/TPO:   Latest Ref Rng  4/6/2022  11:35 AM   [facility] THYROID [facility]     Thyroid Peroxidase Antibody <35 IU/mL >5,000 (H)       All pertinent notes, labs, and images personally reviewed by me.     A/P  Ms.Nichole SAMARA Wise is a 53 year old here for the evaluation of hypothyroidism:    #1 Hypothyroidism (+TPO):  Currently on levothyroxine 88 mcg/day.  Takes generic.  Reports compliance.  Since starting medication she has noticed significant improvement.  Energy levels improved as well.  + wt gain   Plan:  Discussed diagnosis, pathophysiology, management and treatment options of condition with pt.  Based on 6/2023 labs-Thyroid labs are in acceptable range.  Continue current dose of thyroid hormone replacement-- levothyroxine 88 mcg/day.  Repeat labs in 4-6 weeks ( after taking it correctly and consistently)  Further dose adjustment based on that.    Discussed s/s of hypothyroidism and hyperthyroidism to watch for.  The patient indicates understanding of these issues and agrees with the plan.      Follow-up:  Based on labs    Karol Price MD  Endocrinology  [facility]/[place]  CC: Tatiana Oseguera      All questions were answered.  The patient indicates understanding of the above issues and agrees with the plan set forth.

## 2023-10-24 NOTE — PATIENT INSTRUCTIONS
[facility]  Dr Price, Endocrinology Department    [place] [address] [facility]  [place] [address]  Appointment [phone]  Fax: [phone]  [place]: Monday - Thursday      Thyroid labs are in acceptable range.  Continue current dose of thyroid hormone replacement.  Repeat labs in 4-6 weeks ( after taking it correctly and consistently)  Further dose adjustment based on that.    Take Levothyroxine on an empty stomach. Take it with a full glass of water at least 30 minutes to 1 hour before eating breakfast.   This medicine should be taken at least 4 hours before or 4 hours after these medicines: antacids (Maalox , Mylanta , Tums ), calcium supplements, cholestyramine (Prevalite , Questran ), colestipol (Colestid ), iron supplements, orlistat (Ace , Xenical ), simethicone (Gas-X , Mylicon ), and sucralfate (Carafate ).   Swallow the capsule whole. Do not cut or crush it.

## 2023-10-24 NOTE — NURSING NOTE
Is the patient currently in the state of [place]? NO    Visit mode:VIDEO    If the visit is dropped, the patient can be reconnected by: VIDEO VISIT: Text to cell phone:   Telephone Information:   Mobile [phone]       Will anyone else be joining the visit? NO  (If patient encounters technical issues they should call [phone]    How would you like to obtain your AVS? MyChart    Are changes needed to the allergy or medication list? No    Reason for visit: RECHECK and Video Visit    Ceci YU

## 2023-10-24 NOTE — LETTER
"    10/24/2023         RE: Valeri Wise  [address] [facility] [address]  [place] [address]        Dear Colleague,    Thank you for referring your patient, Valeri Wise, to the [facility]. Please see a copy of my visit note below.    THIS IS A VIDEO VISIT:    Phone call visit/virtual visit encounter:    Name of patient: Valeri Wise    Date of encounter: 12/13/2022    Time of start of video visit: 4:00    Video started: 4:13    Video ended: 4:21    Provider location: working from home/ [facility]    Patient location: patients home.    Mode of transmission: video/ Doximity    Verbal consent: obtained before starting visit. Pt is agreeable.      The patient has been notified of following:      \"This VIDEO visit will be conducted via a call between you and your physician/provider. We have found that certain health care needs can be provided without the need for a physical exam.  This service lets us provide the care you need with a short phone conversation.  If a prescription is necessary we can send it directly to your pharmacy.  If lab work is needed we can place an order for that and you can then stop by our lab to have the test done at a later time.     With new updates with corona virus patient might be billed as clinic visit.     If during the course of the call the physician/provider feels a telephone visit is not appropriate, you will not be charged for this service.\"      Past medical history, social history, family history, allergy and medications were reviewed and updated as appropriate.  Reviewed pertinent labs, notes, imaging studies personally.    ENDOCRINOLOGY CLINIC NOTE:  Name: Valeri Wise  Seen or f/u of  Hypothyroidism.  HPI:  Valeri Wise is a 52 year old female who presents for the evaluation of above.   has a past medical history of Disc degeneration, Hashimoto's thyroiditis (5/23/2022), and Uncomplicated asthma.    #1. Hypothyroidism (TPO positive):  Diagnosed in " 2022.  She was having HA and had body aches and tingling as well as was fatigued so thyroid labs were checked which showed TSH 7.78, low FT and +TPO.  Also noted to have low vit B12.  She was started on levothyroxine 100 mcg/day in 2022.    Currently taking levothyroxine 88 mcg/day. (on this dose X 2022)  F/u labs as noted below.  Takes generic.  Takes at night with rest of medications.  She needs coffee in AM so its difficult for her to take LT4 in AM.    + feels tired.  + feels cold  + wt gain    Palpitations:  No  Changes to hair or skin: No  Diarrhea/Constipation:Yes: + chronic constipation  Changes in menses: Yes: h/o hysterectomy  Changes in vision:No  Dysphagia or Shortness of breath:No  Tremors: + sometimes  Changes in weight: as noted above  Heat or cold intolerance:better  History of Lithium or Amiodarone use:No  Head or neck surgery/radiation:No  Family History of Thyroid Problems: Sister with hypothyroidism.  Wt Readings from Last 2 Encounters:   10/24/23 75.3 kg (166 lb)   23 75.7 kg (166 lb 14.4 oz)     PMH/PSH:  Past Medical History:   Diagnosis Date     Disc degeneration     neck     Hashimoto's thyroiditis 2022     Uncomplicated asthma      Past Surgical History:   Procedure Laterality Date     Z NONSPECIFIC PROCEDURE      Jaw surgery--corrective     Z NONSPECIFIC PROCEDURE           Z VAGINAL HYSTERECTOMY  10/2004     Family Hx:  Family History   Problem Relation Age of Onset     Heart Disease Maternal Grandfather      Cancer Maternal Grandfather         leukemia     Diabetes Maternal Grandfather      Coronary Artery Disease Maternal Grandfather      C.A.D. Maternal Grandmother      Other Cancer Maternal Grandmother         Leukemia     Breast Cancer No family hx of      Cancer - colorectal No family hx of      Social Hx:  Social History     Socioeconomic History     Marital status:      Spouse name: Not on file     Number of children: Not on file      Years of education: Not on file     Highest education level: Not on file   Occupational History     Not on file   Tobacco Use     Smoking status: Every Day     Packs/day: 0.50     Years: 5.00     Additional pack years: 0.00     Total pack years: 2.50     Types: Cigarettes     Start date: 4/6/2021     Smokeless tobacco: Never     Tobacco comments:     1.5 PER WEEK    Vaping Use     Vaping Use: Never used   Substance and Sexual Activity     Alcohol use: Yes     Comment: Once or twice a week     Drug use: No     Sexual activity: Yes     Partners: Male     Birth control/protection: Female Surgical     Comment: 2004 Vaginal Hysterectomy   Other Topics Concern     Parent/sibling w/ CABG, MI or angioplasty before 65F 55M? No   Social History Narrative     Not on file     Social Determinants of Health     Financial Resource Strain: Not on file   Food Insecurity: Not on file   Transportation Needs: Not on file   Physical Activity: Not on file   Stress: Not on file   Social Connections: Not on file   Interpersonal Safety: Not on file   Housing Stability: Not on file          MEDICATIONS:  has a current medication list which includes the following prescription(s): citalopram, gabapentin, and levothyroxine.    ROS   ROS: 10 point ROS neg other than the symptoms noted above in the HPI.    Physical Exam   VS: Wt 75.3 kg (166 lb)   LMP 09/22/2004   BMI 28.27 kg/m    GENERAL: healthy, alert and no distress  EYES: Eyes grossly normal to inspection, conjunctivae and sclerae normal  ENT: no nose swelling, nasal discharge.  Thyroid: no apparent thyroid nodules  RESP: no audible wheeze, cough, or visible cyanosis.  No visible retractions or increased work of breathing.  Able to speak fully in complete sentences.  ABDO: not evaluated.  EXTREMITIES: no hand tremors.  NEURO: Cranial nerves grossly intact, mentation intact and speech normal  SKIN: No apparent skin lesions, rash or edema seen   PSYCH: mentation appears normal, affect  normal/bright, judgement and insight intact, normal speech and appearance well-groomed    LABS:  TFTs:   Latest Ref Rng 6/15/2023  11:47 AM   ENDO THYROID LABS-P     TSH 0.30 - 4.20 uIU/mL 2.54    T4 5.0 - 11.0 ug/dL    FREE T4 0.90 - 1.70 ng/dL 1.19      TG/TPO:   Latest Ref Rng 4/6/2022  11:35 AM   ENDO THYROID LABS-[facility]     Thyroid Peroxidase Antibody <35 IU/mL >5,000 (H)       All pertinent notes, labs, and images personally reviewed by me.     A/P  Ms.Valeri Wise is a 53 year old here for the evaluation of hypothyroidism:    #1 Hypothyroidism (+TPO):  Currently on levothyroxine 88 mcg/day.  Takes generic.  Reports compliance.  Since starting medication she has noticed significant improvement.  Energy levels improved as well.  + wt gain   Plan:  Discussed diagnosis, pathophysiology, management and treatment options of condition with pt.  Based on 6/2023 labs-Thyroid labs are in acceptable range.  Continue current dose of thyroid hormone replacement-- levothyroxine 88 mcg/day.  Repeat labs in 4-6 weeks ( after taking it correctly and consistently)  Further dose adjustment based on that.    Discussed s/s of hypothyroidism and hyperthyroidism to watch for.  The patient indicates understanding of these issues and agrees with the plan.      Follow-up:  Based on labs    Karol Price MD  Endocrinology  [facility]/[place]  CC: Tatiana Oseguera      All questions were answered.  The patient indicates understanding of the above issues and agrees with the plan set forth.         Again, thank you for allowing me to participate in the care of your patient.        Sincerely,        Karol Price MD

## 2024-06-20 ENCOUNTER — PATIENT OUTREACH (OUTPATIENT)
Dept: CARE COORDINATION | Facility: CLINIC | Age: 54
End: 2024-06-20
Payer: COMMERCIAL

## 2024-07-04 ENCOUNTER — PATIENT OUTREACH (OUTPATIENT)
Dept: CARE COORDINATION | Facility: CLINIC | Age: 54
End: 2024-07-04
Payer: COMMERCIAL

## 2024-08-22 ENCOUNTER — OFFICE VISIT (OUTPATIENT)
Dept: FAMILY MEDICINE | Facility: CLINIC | Age: 54
End: 2024-08-22
Payer: COMMERCIAL

## 2024-08-22 VITALS
HEART RATE: 75 BPM | BODY MASS INDEX: 28.66 KG/M2 | HEIGHT: 64 IN | SYSTOLIC BLOOD PRESSURE: 143 MMHG | WEIGHT: 167.9 LBS | DIASTOLIC BLOOD PRESSURE: 84 MMHG | RESPIRATION RATE: 15 BRPM | OXYGEN SATURATION: 95 % | TEMPERATURE: 97.8 F

## 2024-08-22 DIAGNOSIS — F41.9 ANXIETY: ICD-10-CM

## 2024-08-22 DIAGNOSIS — R76.8 ANTI-TPO ANTIBODIES PRESENT: ICD-10-CM

## 2024-08-22 DIAGNOSIS — M50.30 DDD (DEGENERATIVE DISC DISEASE), CERVICAL: Primary | ICD-10-CM

## 2024-08-22 DIAGNOSIS — M54.2 CERVICALGIA: ICD-10-CM

## 2024-08-22 DIAGNOSIS — E03.9 HYPOTHYROIDISM, UNSPECIFIED TYPE: ICD-10-CM

## 2024-08-22 DIAGNOSIS — L60.3 BRITTLE NAILS: ICD-10-CM

## 2024-08-22 DIAGNOSIS — E83.52 SERUM CALCIUM ELEVATED: ICD-10-CM

## 2024-08-22 DIAGNOSIS — E06.3 HASHIMOTO'S THYROIDITIS: ICD-10-CM

## 2024-08-22 LAB
ALBUMIN SERPL BCG-MCNC: 4.1 G/DL (ref 3.5–5.2)
ALP SERPL-CCNC: 75 U/L (ref 40–150)
ALT SERPL W P-5'-P-CCNC: 19 U/L (ref 0–50)
ANION GAP SERPL CALCULATED.3IONS-SCNC: 11 MMOL/L (ref 7–15)
AST SERPL W P-5'-P-CCNC: 30 U/L (ref 0–45)
BILIRUB SERPL-MCNC: 0.5 MG/DL
BUN SERPL-MCNC: 11.7 MG/DL (ref 6–20)
CALCIUM SERPL-MCNC: 10.6 MG/DL (ref 8.8–10.4)
CHLORIDE SERPL-SCNC: 105 MMOL/L (ref 98–107)
CHOLEST SERPL-MCNC: 205 MG/DL
CREAT SERPL-MCNC: 0.85 MG/DL (ref 0.51–0.95)
EGFRCR SERPLBLD CKD-EPI 2021: 80 ML/MIN/1.73M2
FASTING STATUS PATIENT QL REPORTED: YES
FASTING STATUS PATIENT QL REPORTED: YES
GLUCOSE SERPL-MCNC: 98 MG/DL (ref 70–99)
HCO3 SERPL-SCNC: 22 MMOL/L (ref 22–29)
HDLC SERPL-MCNC: 39 MG/DL
LDLC SERPL CALC-MCNC: 138 MG/DL
NONHDLC SERPL-MCNC: 166 MG/DL
POTASSIUM SERPL-SCNC: 4.2 MMOL/L (ref 3.4–5.3)
PROT SERPL-MCNC: 7.1 G/DL (ref 6.4–8.3)
SODIUM SERPL-SCNC: 138 MMOL/L (ref 135–145)
TRIGL SERPL-MCNC: 139 MG/DL
TSH SERPL DL<=0.005 MIU/L-ACNC: 2.36 UIU/ML (ref 0.3–4.2)

## 2024-08-22 PROCEDURE — 99214 OFFICE O/P EST MOD 30 MIN: CPT | Performed by: GENERAL PRACTICE

## 2024-08-22 PROCEDURE — 80053 COMPREHEN METABOLIC PANEL: CPT | Performed by: GENERAL PRACTICE

## 2024-08-22 PROCEDURE — 86376 MICROSOMAL ANTIBODY EACH: CPT | Performed by: GENERAL PRACTICE

## 2024-08-22 PROCEDURE — 84443 ASSAY THYROID STIM HORMONE: CPT | Performed by: GENERAL PRACTICE

## 2024-08-22 PROCEDURE — 36415 COLL VENOUS BLD VENIPUNCTURE: CPT | Performed by: GENERAL PRACTICE

## 2024-08-22 PROCEDURE — 80061 LIPID PANEL: CPT | Performed by: GENERAL PRACTICE

## 2024-08-22 PROCEDURE — 96127 BRIEF EMOTIONAL/BEHAV ASSMT: CPT | Performed by: GENERAL PRACTICE

## 2024-08-22 RX ORDER — CITALOPRAM HYDROBROMIDE 20 MG/1
20 TABLET ORAL DAILY
Qty: 90 TABLET | Refills: 3 | Status: SHIPPED | OUTPATIENT
Start: 2024-08-22 | End: 2024-10-03

## 2024-08-22 RX ORDER — GABAPENTIN 300 MG/1
300 CAPSULE ORAL 3 TIMES DAILY
Qty: 270 CAPSULE | Refills: 3 | Status: SHIPPED | OUTPATIENT
Start: 2024-08-22

## 2024-08-22 RX ORDER — LEVOTHYROXINE SODIUM 88 UG/1
88 TABLET ORAL DAILY
Qty: 90 TABLET | Refills: 0 | Status: SHIPPED | OUTPATIENT
Start: 2024-08-22 | End: 2024-09-23

## 2024-08-22 ASSESSMENT — ANXIETY QUESTIONNAIRES
7. FEELING AFRAID AS IF SOMETHING AWFUL MIGHT HAPPEN: NOT AT ALL
2. NOT BEING ABLE TO STOP OR CONTROL WORRYING: NOT AT ALL
3. WORRYING TOO MUCH ABOUT DIFFERENT THINGS: SEVERAL DAYS
GAD7 TOTAL SCORE: 5
8. IF YOU CHECKED OFF ANY PROBLEMS, HOW DIFFICULT HAVE THESE MADE IT FOR YOU TO DO YOUR WORK, TAKE CARE OF THINGS AT HOME, OR GET ALONG WITH OTHER PEOPLE?: SOMEWHAT DIFFICULT
1. FEELING NERVOUS, ANXIOUS, OR ON EDGE: SEVERAL DAYS
IF YOU CHECKED OFF ANY PROBLEMS ON THIS QUESTIONNAIRE, HOW DIFFICULT HAVE THESE PROBLEMS MADE IT FOR YOU TO DO YOUR WORK, TAKE CARE OF THINGS AT HOME, OR GET ALONG WITH OTHER PEOPLE: SOMEWHAT DIFFICULT
4. TROUBLE RELAXING: SEVERAL DAYS
GAD7 TOTAL SCORE: 5
6. BECOMING EASILY ANNOYED OR IRRITABLE: SEVERAL DAYS
5. BEING SO RESTLESS THAT IT IS HARD TO SIT STILL: SEVERAL DAYS

## 2024-08-22 ASSESSMENT — PAIN SCALES - GENERAL: PAINLEVEL: NO PAIN (0)

## 2024-08-22 NOTE — PROGRESS NOTES
"  Assessment & Plan     DDD (degenerative disc disease), cervical  Refill  - gabapentin (NEURONTIN) 300 MG capsule; Take 1 capsule (300 mg) by mouth 3 times daily.    Anxiety  Refill  - citalopram (CELEXA) 20 MG tablet; Take 1 tablet (20 mg) by mouth daily.    Cervicalgia  Refill  - gabapentin (NEURONTIN) 300 MG capsule; Take 1 capsule (300 mg) by mouth 3 times daily.    Hypothyroidism, unspecified type  Refill   Wants to repeat TPA  - levothyroxine (SYNTHROID/LEVOTHROID) 88 MCG tablet; Take 1 tablet (88 mcg) by mouth daily.  - TSH with free T4 reflex; Future  - Thyroid peroxidase antibody; Future  - Comprehensive metabolic panel (BMP + Alb, Alk Phos, ALT, AST, Total. Bili, TP); Future  - Lipid panel reflex to direct LDL Fasting; Future  - TSH with free T4 reflex  - Thyroid peroxidase antibody  - Comprehensive metabolic panel (BMP + Alb, Alk Phos, ALT, AST, Total. Bili, TP)  - Lipid panel reflex to direct LDL Fasting    Hashimoto's thyroiditis  Refill  - levothyroxine (SYNTHROID/LEVOTHROID) 88 MCG tablet; Take 1 tablet (88 mcg) by mouth daily.    Anti-TPO antibodies present    - levothyroxine (SYNTHROID/LEVOTHROID) 88 MCG tablet; Take 1 tablet (88 mcg) by mouth daily.    Serum calcium elevated  Repeat in 2 months  - Calcium; Future  - Vitamin D Deficiency; Future    Brittle nails  Monitor          Nicotine/Tobacco Cessation  She reports that she has been smoking. She started smoking about 3 years ago. She has a 2.5 pack-year smoking history. She has never used smokeless tobacco.  Nicotine/Tobacco Cessation Plan  Information offered: Patient not interested at this time      BMI  Estimated body mass index is 28.82 kg/m  as calculated from the following:    Height as of this encounter: 1.626 m (5' 4\").    Weight as of this encounter: 76.2 kg (167 lb 14.4 oz).             Rehana Trammell is a 54 year old, presenting for the following health issues:  Recheck Medication (L great toenail is abnormal. Pt reports that " "she noticed the issue ~3 months ago. Pt denies any injury to it. Pt requests a blood draw as she is seeing her endocrinologist in September.)      8/22/2024     8:34 AM   Additional Questions   Roomed by Ileana Sr MA         8/22/2024     8:34 AM   Patient Reported Additional Medications   Patient reports taking the following new medications None       Med Check        Left great toe  Brittle nail, tip is broken  Does color own nails        History of Present Illness       Mental Health Follow-up:  Patient presents to follow-up on Depression & Anxiety.Patient's depression since last visit has been:  Good  The patient is not having other symptoms associated with depression.  Patient's anxiety since last visit has been:  Good  The patient is not having other symptoms associated with anxiety.  Any significant life events: health concerns  Patient is feeling anxious or having panic attacks.  Patient has no concerns about alcohol or drug use.   She is taking medications regularly.           Review of Systems  Constitutional, HEENT, cardiovascular, pulmonary, gi and gu systems are negative, except as otherwise noted.      Objective    BP (!) 143/84 (BP Location: Right arm, Patient Position: Sitting, Cuff Size: Adult Regular)   Pulse 75   Temp 97.8  F (36.6  C) (Oral)   Resp 15   Ht 1.626 m (5' 4\")   Wt 76.2 kg (167 lb 14.4 oz)   LMP 09/22/2004   SpO2 95%   BMI 28.82 kg/m    Body mass index is 28.82 kg/m .  Physical Exam  Constitutional:       Appearance: Normal appearance.   Eyes:      Extraocular Movements: Extraocular movements intact.   Cardiovascular:      Rate and Rhythm: Normal rate and regular rhythm.   Pulmonary:      Effort: Pulmonary effort is normal.      Breath sounds: Normal breath sounds.   Abdominal:      Palpations: Abdomen is soft.   Musculoskeletal:         General: Normal range of motion.      Cervical back: Normal range of motion.   Feet:      Comments: L great toe. Broken nail tip and nail " growth beneath  Skin:     General: Skin is warm.   Neurological:      General: No focal deficit present.      Mental Status: She is alert and oriented to person, place, and time. Mental status is at baseline.   Psychiatric:         Mood and Affect: Mood normal.         Behavior: Behavior normal.         Thought Content: Thought content normal.         Judgment: Judgment normal.                    Signed Electronically by: Lakesha Avalos MD

## 2024-08-23 LAB — THYROPEROXIDASE AB SERPL-ACNC: 3356 IU/ML

## 2024-09-23 ENCOUNTER — VIRTUAL VISIT (OUTPATIENT)
Dept: ENDOCRINOLOGY | Facility: CLINIC | Age: 54
End: 2024-09-23
Payer: COMMERCIAL

## 2024-09-23 DIAGNOSIS — E03.9 HYPOTHYROIDISM, UNSPECIFIED TYPE: ICD-10-CM

## 2024-09-23 DIAGNOSIS — E06.3 HASHIMOTO'S THYROIDITIS: Primary | ICD-10-CM

## 2024-09-23 DIAGNOSIS — E78.5 HYPERLIPIDEMIA LDL GOAL <160: ICD-10-CM

## 2024-09-23 DIAGNOSIS — R76.8 ANTI-TPO ANTIBODIES PRESENT: ICD-10-CM

## 2024-09-23 PROCEDURE — G2211 COMPLEX E/M VISIT ADD ON: HCPCS | Mod: 95 | Performed by: INTERNAL MEDICINE

## 2024-09-23 PROCEDURE — 99214 OFFICE O/P EST MOD 30 MIN: CPT | Mod: 95 | Performed by: INTERNAL MEDICINE

## 2024-09-23 RX ORDER — LEVOTHYROXINE SODIUM 88 UG/1
88 TABLET ORAL DAILY
Qty: 90 TABLET | Refills: 3 | Status: SHIPPED | OUTPATIENT
Start: 2024-09-23

## 2024-09-23 NOTE — PROGRESS NOTES
"THIS IS A VIDEO VISIT:    Phone call visit/virtual visit encounter:    Name of patient: Valeri Wise    Date of encounter: 9/23/2024    Time of start of video visit: 8:30    Video started: 8:38    Video ended: 8:54    Provider location: working from [place]/ [facility]    Patient location: patients home.    Mode of transmission: video/ Doximity    Verbal consent: obtained before starting visit. Pt is agreeable.      The patient has been notified of following:      \"This VIDEO visit will be conducted via a call between you and your physician/provider. We have found that certain health care needs can be provided without the need for a physical exam.  This service lets us provide the care you need with a short phone conversation.  If a prescription is necessary we can send it directly to your pharmacy.  If lab work is needed we can place an order for that and you can then stop by our lab to have the test done at a later time.     With new updates with corona virus patient might be billed as clinic visit.     If during the course of the call the physician/provider feels a telephone visit is not appropriate, you will not be charged for this service.\"      Past medical history, social history, family history, allergy and medications were reviewed and updated as appropriate.  Reviewed pertinent labs, notes, imaging studies personally.    ENDOCRINOLOGY CLINIC NOTE:  Name: Valeri Wise  Seen or f/u of  Hypothyroidism.  HPI:  Valeri Wise is a 54 year old female who presents for the evaluation of above.   has a past medical history of Disc degeneration, Hashimoto's thyroiditis (5/23/2022), and Uncomplicated asthma.    #1. Hypothyroidism (TPO positive):  Diagnosed in 4/2022.  She was having HA and had body aches and tingling as well as was fatigued so thyroid labs were checked which showed TSH 7.78, low FT and +TPO.  Also noted to have low vit B12.  She was started on levothyroxine 100 mcg/day in 4/2022.    Currently " taking levothyroxine 88 mcg/day. (on this dose X 2022)  ( She ran out of levothyroxine 88 mcg/day and using 100 mcg/day since last 3 weeks)   24 labs ( in range) were done when she was taking 88 mcg/day dose consistently.    Feeling better on 100 mcg/day.    Takes generic.  Takes it in AM.    + feels tired.    Palpitations:  No  Changes to hair or skin: No  Diarrhea/Constipation:Yes: + chronic constipation- no change  Changes in menses: Yes: h/o hysterectomy  Changes in vision:No  Dysphagia or Shortness of breath:No  Tremors: No  Changes in weight: mostly stable.  Heat or cold intolerance:better  History of Lithium or Amiodarone use:No  Head or neck surgery/radiation:No  Family History of Thyroid Problems: Sister with hypothyroidism.  Wt Readings from Last 2 Encounters:   24 76.2 kg (167 lb 14.4 oz)   10/24/23 75.3 kg (166 lb)     PMH/PSH:  Past Medical History:   Diagnosis Date    Disc degeneration     neck    Hashimoto's thyroiditis 2022    Uncomplicated asthma      Past Surgical History:   Procedure Laterality Date    Z NONSPECIFIC PROCEDURE      Jaw surgery--corrective    ZZC NONSPECIFIC PROCEDURE          ZZ VAGINAL HYSTERECTOMY  10/2004     Family Hx:  Family History   Problem Relation Age of Onset    Heart Disease Maternal Grandfather     Cancer Maternal Grandfather         leukemia    Diabetes Maternal Grandfather     Coronary Artery Disease Maternal Grandfather     C.A.D. Maternal Grandmother     Other Cancer Maternal Grandmother         Leukemia    Breast Cancer No family hx of     Cancer - colorectal No family hx of      Social Hx:  Social History     Socioeconomic History    Marital status:      Spouse name: Not on file    Number of children: Not on file    Years of education: Not on file    Highest education level: Not on file   Occupational History    Not on file   Tobacco Use    Smoking status: Every Day     Current packs/day: 0.50     Average packs/day: 0.5  packs/day for 5.0 years (2.5 ttl pk-yrs)     Types: Cigarettes     Start date: 4/6/2021    Smokeless tobacco: Never    Tobacco comments:     1.5 PER WEEK    Vaping Use    Vaping status: Never Used   Substance and Sexual Activity    Alcohol use: Yes     Comment: Once or twice a week    Drug use: No    Sexual activity: Yes     Partners: Male     Birth control/protection: Female Surgical     Comment: 2004 Vaginal Hysterectomy   Other Topics Concern    Parent/sibling w/ CABG, MI or angioplasty before 65F 55M? No   Social History Narrative    Not on file     Social Determinants of Health     Financial Resource Strain: Not on file   Food Insecurity: Not on file   Transportation Needs: Not on file   Physical Activity: Not on file   Stress: Not on file   Social Connections: Not on file   Interpersonal Safety: Low Risk  (8/22/2024)    Interpersonal Safety     Do you feel physically and emotionally safe where you currently live?: Not on file     Within the past 12 months, have you been hit, slapped, kicked or otherwise physically hurt by someone?: No     Within the past 12 months, have you been humiliated or emotionally abused in other ways by your partner or ex-partner?: No   Housing Stability: Not on file          MEDICATIONS:  has a current medication list which includes the following prescription(s): citalopram, gabapentin, and levothyroxine.    ROS   ROS: 10 point ROS neg other than the symptoms noted above in the HPI.    Physical Exam   VS: LMP 09/22/2004   GENERAL: healthy, alert and no distress  EYES: Eyes grossly normal to inspection, conjunctivae and sclerae normal  ENT: no nose swelling, nasal discharge.  Thyroid: no apparent thyroid nodules  RESP: no audible wheeze, cough, or visible cyanosis.  No visible retractions or increased work of breathing.  Able to speak fully in complete sentences.  ABDO: not evaluated.  EXTREMITIES: no hand tremors.  NEURO: Cranial nerves grossly intact, mentation intact and speech  normal  SKIN: No apparent skin lesions, rash or edema seen   PSYCH: mentation appears normal, affect normal/bright, judgement and insight intact, normal speech and appearance well-groomed    LABS:  TFTs:   Latest Ref Rng 8/22/2024  9:09 AM   ENDO THYROID LABS-[facility]     TSH 0.30 - 4.20 uIU/mL 2.36      TG/TPO:   Latest Ref Rng 8/22/2024  9:09 AM   ENDO THYROID LABS-[facility]     Thyroid Peroxidase Antibody <35 IU/mL 3,356 (H)        Latest Ref Rng 4/6/2022  11:35 AM   ENDO THYROID LABS-[facility]     Thyroid Peroxidase Antibody <35 IU/mL >5,000 (H)       All pertinent notes, labs, and images personally reviewed by me.     A/P  Ms.Nichole SAMARA Wise is a 54 year old here for the evaluation of hypothyroidism:    #1 Hypothyroidism (+TPO):  Currently on levothyroxine 88 mcg/day. ( More recently 100 mcg /day for last 3 weeks as she ran out of 88 mcg tab)  Takes generic.  Reports compliance.  Plan:  Discussed diagnosis, pathophysiology, management and treatment options of condition with pt.  She is feeling better on 100 mcg/day but I discussed risk with over replacement.  Restart levothyroxine 88 mcg/day  Labs in 2 months ( lab only appointment)  Dose change based on that. Consider 100 mcg/day dose if TSH is in upper normal range as she is feeling better on 100 mcg/day dose.  Labs and follow up in 1 year or sooner if concerns.  Discussed s/s of hypothyroidism and hyperthyroidism to watch for.  The patient indicates understanding of these issues and agrees with the plan.    Discussed indications, risks and benefits of all medications prescribed, and answered questions to patient's satisfaction.  The longitudinal plan of care for the diagnosis(es)/condition(s) as documented were addressed during this visit. Due to the added complexity in care, I will continue to support Valeri in the subsequent management and with ongoing continuity of care.  All questions were answered.  The patient indicates understanding of the above issues and agrees with  the plan set forth.      Follow-up:  Based on labs    Karol Price MD  Endocrinology  [facility]/[place]  CC: Tatiana Oseguera      All questions were answered.  The patient indicates understanding of the above issues and agrees with the plan set forth.

## 2024-09-23 NOTE — PATIENT INSTRUCTIONS
[facility]  Dr Price, Endocrinology Department    [place] [address] [id number]  [place] [address]  Appointment [phone]  Fax: [phone]  [place]: Monday - Thursday      Start levothyroxine 88 mcg/day  Labs in 2 months ( lab only appointment)  Dose change based on that.  If labs are in range, then plan to continue current dose and labs and follow up in 1 year or sooner if concerns.  Please make a lab appointment for blood work and follow up clinic appointment in 1 week after that to discuss results.  ( Recommend in person visit)    Take Levothyroxine on an empty stomach. Take it with a full glass of water at least 30 minutes to 1 hour before eating breakfast.   This medicine should be taken at least 4 hours before or 4 hours after these medicines: antacids (Maalox , Mylanta , Tums ), calcium supplements, cholestyramine (Prevalite , Questran ), colestipol (Colestid ), iron supplements, orlistat (Ace , Xenical ), simethicone (Gas-X , Mylicon ), and sucralfate (Carafate ).   Swallow the capsule whole. Do not cut or crush it.

## 2024-09-23 NOTE — LETTER
"9/23/2024      Valeri Wise  [address] [facility] [id number]      Dear Colleague,    Thank you for referring your patient, Valeri Wise, to the [facility]. Please see a copy of my visit note below.    THIS IS A VIDEO VISIT:    Phone call visit/virtual visit encounter:    Name of patient: Valeri Wise    Date of encounter: 9/23/2024    Time of start of video visit: 8:30    Video started: 8:38    Video ended: 8:54    Provider location: working from home/ [facility]    Patient location: patients home.    Mode of transmission: video/ Doximity    Verbal consent: obtained before starting visit. Pt is agreeable.      The patient has been notified of following:      \"This VIDEO visit will be conducted via a call between you and your physician/provider. We have found that certain health care needs can be provided without the need for a physical exam.  This service lets us provide the care you need with a short phone conversation.  If a prescription is necessary we can send it directly to your pharmacy.  If lab work is needed we can place an order for that and you can then stop by our lab to have the test done at a later time.     With new updates with corona virus patient might be billed as clinic visit.     If during the course of the call the physician/provider feels a telephone visit is not appropriate, you will not be charged for this service.\"      Past medical history, social history, family history, allergy and medications were reviewed and updated as appropriate.  Reviewed pertinent labs, notes, imaging studies personally.    ENDOCRINOLOGY CLINIC NOTE:  Name: Valeri Wise  Seen or f/u of  Hypothyroidism.  HPI:  Valeri Wise is a 54 year old female who presents for the evaluation of above.   has a past medical history of Disc degeneration, Hashimoto's thyroiditis (5/23/2022), and Uncomplicated asthma.    #1. Hypothyroidism (TPO positive):  Diagnosed in 4/2022.  She was " having HA and had body aches and tingling as well as was fatigued so thyroid labs were checked which showed TSH 7.78, low FT and +TPO.  Also noted to have low vit B12.  She was started on levothyroxine 100 mcg/day in 2022.    Currently taking levothyroxine 88 mcg/day. (on this dose X 2022)  ( She ran out of levothyroxine 88 mcg/day and using 100 mcg/day since last 3 weeks)   24 labs ( in range) were done when she was taking 88 mcg/day dose consistently.    Feeling better on 100 mcg/day.    Takes generic.  Takes it in AM.    + feels tired.    Palpitations:  No  Changes to hair or skin: No  Diarrhea/Constipation:Yes: + chronic constipation- no change  Changes in menses: Yes: h/o hysterectomy  Changes in vision:No  Dysphagia or Shortness of breath:No  Tremors: No  Changes in weight: mostly stable.  Heat or cold intolerance:better  History of Lithium or Amiodarone use:No  Head or neck surgery/radiation:No  Family History of Thyroid Problems: Sister with hypothyroidism.  Wt Readings from Last 2 Encounters:   24 76.2 kg (167 lb 14.4 oz)   10/24/23 75.3 kg (166 lb)     PMH/PSH:  Past Medical History:   Diagnosis Date     Disc degeneration     neck     Hashimoto's thyroiditis 2022     Uncomplicated asthma      Past Surgical History:   Procedure Laterality Date     Z NONSPECIFIC PROCEDURE      Jaw surgery--corrective     ZZC NONSPECIFIC PROCEDURE           Z VAGINAL HYSTERECTOMY  10/2004     Family Hx:  Family History   Problem Relation Age of Onset     Heart Disease Maternal Grandfather      Cancer Maternal Grandfather         leukemia     Diabetes Maternal Grandfather      Coronary Artery Disease Maternal Grandfather      C.A.D. Maternal Grandmother      Other Cancer Maternal Grandmother         Leukemia     Breast Cancer No family hx of      Cancer - colorectal No family hx of      Social Hx:  Social History     Socioeconomic History     Marital status:      Spouse name: Not  on file     Number of children: Not on file     Years of education: Not on file     Highest education level: Not on file   Occupational History     Not on file   Tobacco Use     Smoking status: Every Day     Current packs/day: 0.50     Average packs/day: 0.5 packs/day for 5.0 years (2.5 ttl pk-yrs)     Types: Cigarettes     Start date: 4/6/2021     Smokeless tobacco: Never     Tobacco comments:     1.5 PER WEEK    Vaping Use     Vaping status: Never Used   Substance and Sexual Activity     Alcohol use: Yes     Comment: Once or twice a week     Drug use: No     Sexual activity: Yes     Partners: Male     Birth control/protection: Female Surgical     Comment: 2004 Vaginal Hysterectomy   Other Topics Concern     Parent/sibling w/ CABG, MI or angioplasty before 65F 55M? No   Social History Narrative     Not on file     Social Determinants of Health     Financial Resource Strain: Not on file   Food Insecurity: Not on file   Transportation Needs: Not on file   Physical Activity: Not on file   Stress: Not on file   Social Connections: Not on file   Interpersonal Safety: Low Risk  (8/22/2024)    Interpersonal Safety      Do you feel physically and emotionally safe where you currently live?: Not on file      Within the past 12 months, have you been hit, slapped, kicked or otherwise physically hurt by someone?: No      Within the past 12 months, have you been humiliated or emotionally abused in other ways by your partner or ex-partner?: No   Housing Stability: Not on file          MEDICATIONS:  has a current medication list which includes the following prescription(s): citalopram, gabapentin, and levothyroxine.    ROS   ROS: 10 point ROS neg other than the symptoms noted above in the HPI.    Physical Exam   VS: LMP 09/22/2004   GENERAL: healthy, alert and no distress  EYES: Eyes grossly normal to inspection, conjunctivae and sclerae normal  ENT: no nose swelling, nasal discharge.  Thyroid: no apparent thyroid nodules  RESP:  no audible wheeze, cough, or visible cyanosis.  No visible retractions or increased work of breathing.  Able to speak fully in complete sentences.  ABDO: not evaluated.  EXTREMITIES: no hand tremors.  NEURO: Cranial nerves grossly intact, mentation intact and speech normal  SKIN: No apparent skin lesions, rash or edema seen   PSYCH: mentation appears normal, affect normal/bright, judgement and insight intact, normal speech and appearance well-groomed    LABS:  TFTs:   Latest Ref Rng 8/22/2024  9:09 AM   [facility]     TSH 0.30 - 4.20 uIU/mL 2.36      TG/TPO:   Latest Ref Rng 8/22/2024  9:09 AM   [facility]     Thyroid Peroxidase Antibody <35 IU/mL 3,356 (H)        Latest Ref Rng 4/6/2022  11:35 AM   [facility]     Thyroid Peroxidase Antibody <35 IU/mL >5,000 (H)       All pertinent notes, labs, and images personally reviewed by me.     A/P  Ms.Nichole SAMARA Wise is a 54 year old here for the evaluation of hypothyroidism:    #1 Hypothyroidism (+TPO):  Currently on levothyroxine 88 mcg/day. ( More recently 100 mcg /day for last 3 weeks as she ran out of 88 mcg tab)  Takes generic.  Reports compliance.  Plan:  Discussed diagnosis, pathophysiology, management and treatment options of condition with pt.  She is feeling better on 100 mcg/day but I discussed risk with over replacement.  Restart levothyroxine 88 mcg/day  Labs in 2 months ( lab only appointment)  Dose change based on that. Consider 100 mcg/day dose if TSH is in upper normal range as she is feeling better on 100 mcg/day dose.  Labs and follow up in 1 year or sooner if concerns.  Discussed s/s of hypothyroidism and hyperthyroidism to watch for.  The patient indicates understanding of these issues and agrees with the plan.    Discussed indications, risks and benefits of all medications prescribed, and answered questions to patient's satisfaction.  The longitudinal plan of care for the diagnosis(es)/condition(s) as documented were  addressed during this visit. Due to the added complexity in care, I will continue to support Valeri in the subsequent management and with ongoing continuity of care.  All questions were answered.  The patient indicates understanding of the above issues and agrees with the plan set forth.      Follow-up:  Based on labs    Karol Price MD  Endocrinology  [facility]  CC: Tatiana Oseguera      All questions were answered.  The patient indicates understanding of the above issues and agrees with the plan set forth.         Again, thank you for allowing me to participate in the care of your patient.        Sincerely,        Karol Price MD

## 2024-09-23 NOTE — NURSING NOTE
Current patient location: [id number] [place] [id number]    Is the patient currently in the state of [place]? YES    Visit mode:VIDEO    If the visit is dropped, the patient can be reconnected by: VIDEO VISIT: Send to e-mail at: [email or web address]    Will anyone else be joining the visit? NO  (If patient encounters technical issues they should call [phone])    How would you like to obtain your AVS? MyChart    Are changes needed to the allergy or medication list? No    Are refills needed on medications prescribed by this physician? YES  PT needs refill on levothyroxine     Rooming Documentation:  Not applicable    Reason for visit: RECHECK (Thyroid cancer follow-up )    Nay YU

## 2024-10-03 ENCOUNTER — TELEPHONE (OUTPATIENT)
Dept: FAMILY MEDICINE | Facility: CLINIC | Age: 54
End: 2024-10-03
Payer: COMMERCIAL

## 2024-10-03 DIAGNOSIS — F41.9 ANXIETY: ICD-10-CM

## 2024-10-03 NOTE — TELEPHONE ENCOUNTER
"Valeri calls- her pharmacy closed and when they transferred her prescriptions with refills to her new pharmacy they \"lost\" one.  She only has a couple of days before she is out of medication.    Please send Rx w/refills of below medication to new pharmacy.    citalopram (CELEXA) 20 MG tablet 90 tablet 3 8/22/2024 -- No   Sig - Route: Take 1 tablet (20 mg) by mouth daily. - Oral     [id number] - [place], [place] - [address] [address] AT [place] [address] & [facility] [phone]         [address], - [place] [address]  Primary Care- [facility]    "

## 2024-10-04 RX ORDER — CITALOPRAM HYDROBROMIDE 20 MG/1
20 TABLET ORAL DAILY
Qty: 90 TABLET | Refills: 3 | Status: SHIPPED | OUTPATIENT
Start: 2024-10-04

## 2024-10-04 NOTE — TELEPHONE ENCOUNTER
I have not seen her since 2022.  Recently seen by  and med was sent by her.  Will route to her to advise on refill.     Bridget Gay CNP

## 2024-10-27 ENCOUNTER — HEALTH MAINTENANCE LETTER (OUTPATIENT)
Age: 54
End: 2024-10-27

## 2025-02-20 ENCOUNTER — TELEPHONE (OUTPATIENT)
Dept: FAMILY MEDICINE | Facility: CLINIC | Age: 55
End: 2025-02-20
Payer: COMMERCIAL

## 2025-02-20 NOTE — TELEPHONE ENCOUNTER
Patient Quality Outreach    Patient is due for the following:   Colon Cancer Screening    Action(s) Taken:   No follow up needed at this time.    Type of outreach:    Sent [employer] message.    Questions for provider review:    None           Elissa Mccall MA

## 2025-08-26 ENCOUNTER — OFFICE VISIT (OUTPATIENT)
Dept: FAMILY MEDICINE | Facility: CLINIC | Age: 55
End: 2025-08-26
Payer: COMMERCIAL

## 2025-08-26 VITALS
DIASTOLIC BLOOD PRESSURE: 78 MMHG | TEMPERATURE: 98.8 F | OXYGEN SATURATION: 96 % | HEART RATE: 74 BPM | RESPIRATION RATE: 12 BRPM | HEIGHT: 64 IN | BODY MASS INDEX: 29.84 KG/M2 | WEIGHT: 174.8 LBS | SYSTOLIC BLOOD PRESSURE: 122 MMHG

## 2025-08-26 DIAGNOSIS — G25.81 RESTLESS LEGS SYNDROME (RLS): ICD-10-CM

## 2025-08-26 DIAGNOSIS — Z12.31 VISIT FOR SCREENING MAMMOGRAM: ICD-10-CM

## 2025-08-26 DIAGNOSIS — E03.9 HYPOTHYROIDISM, UNSPECIFIED TYPE: ICD-10-CM

## 2025-08-26 DIAGNOSIS — E06.3 HASHIMOTO'S THYROIDITIS: ICD-10-CM

## 2025-08-26 DIAGNOSIS — F41.9 ANXIETY: ICD-10-CM

## 2025-08-26 DIAGNOSIS — Z12.11 SCREEN FOR COLON CANCER: ICD-10-CM

## 2025-08-26 DIAGNOSIS — E78.5 HYPERLIPIDEMIA LDL GOAL <160: ICD-10-CM

## 2025-08-26 DIAGNOSIS — Z00.00 ROUTINE GENERAL MEDICAL EXAMINATION AT A HEALTH CARE FACILITY: Primary | ICD-10-CM

## 2025-08-26 DIAGNOSIS — E83.52 HYPERCALCEMIA: ICD-10-CM

## 2025-08-26 PROCEDURE — 3078F DIAST BP <80 MM HG: CPT | Performed by: GENERAL PRACTICE

## 2025-08-26 PROCEDURE — 90471 IMMUNIZATION ADMIN: CPT | Performed by: GENERAL PRACTICE

## 2025-08-26 PROCEDURE — 82570 ASSAY OF URINE CREATININE: CPT | Performed by: GENERAL PRACTICE

## 2025-08-26 PROCEDURE — 36415 COLL VENOUS BLD VENIPUNCTURE: CPT | Performed by: GENERAL PRACTICE

## 2025-08-26 PROCEDURE — 99396 PREV VISIT EST AGE 40-64: CPT | Mod: 25 | Performed by: GENERAL PRACTICE

## 2025-08-26 PROCEDURE — 1125F AMNT PAIN NOTED PAIN PRSNT: CPT | Performed by: GENERAL PRACTICE

## 2025-08-26 PROCEDURE — 84443 ASSAY THYROID STIM HORMONE: CPT | Performed by: GENERAL PRACTICE

## 2025-08-26 PROCEDURE — 82306 VITAMIN D 25 HYDROXY: CPT | Performed by: GENERAL PRACTICE

## 2025-08-26 PROCEDURE — 99214 OFFICE O/P EST MOD 30 MIN: CPT | Mod: 25 | Performed by: GENERAL PRACTICE

## 2025-08-26 PROCEDURE — 80053 COMPREHEN METABOLIC PANEL: CPT | Performed by: GENERAL PRACTICE

## 2025-08-26 PROCEDURE — 90715 TDAP VACCINE 7 YRS/> IM: CPT | Performed by: GENERAL PRACTICE

## 2025-08-26 PROCEDURE — 3074F SYST BP LT 130 MM HG: CPT | Performed by: GENERAL PRACTICE

## 2025-08-26 PROCEDURE — 84439 ASSAY OF FREE THYROXINE: CPT | Performed by: GENERAL PRACTICE

## 2025-08-26 PROCEDURE — 82043 UR ALBUMIN QUANTITATIVE: CPT | Performed by: GENERAL PRACTICE

## 2025-08-26 RX ORDER — CITALOPRAM HYDROBROMIDE 20 MG/1
20 TABLET ORAL DAILY
Qty: 90 TABLET | Refills: 3 | Status: SHIPPED | OUTPATIENT
Start: 2025-08-26

## 2025-08-26 RX ORDER — ROPINIROLE 0.25 MG/1
TABLET, FILM COATED ORAL
Qty: 30 TABLET | Refills: 1 | Status: SHIPPED | OUTPATIENT
Start: 2025-08-26

## 2025-08-26 SDOH — HEALTH STABILITY: PHYSICAL HEALTH: ON AVERAGE, HOW MANY DAYS PER WEEK DO YOU ENGAGE IN MODERATE TO STRENUOUS EXERCISE (LIKE A BRISK WALK)?: 1 DAY

## 2025-08-26 ASSESSMENT — PAIN SCALES - GENERAL: PAINLEVEL_OUTOF10: MODERATE PAIN (5)

## 2025-08-27 LAB
ALBUMIN SERPL BCG-MCNC: 4.2 G/DL (ref 3.5–5.2)
ALP SERPL-CCNC: 73 U/L (ref 40–150)
ALT SERPL W P-5'-P-CCNC: 22 U/L (ref 0–50)
ANION GAP SERPL CALCULATED.3IONS-SCNC: 10 MMOL/L (ref 7–15)
AST SERPL W P-5'-P-CCNC: 32 U/L (ref 0–45)
BILIRUB SERPL-MCNC: 0.5 MG/DL
BUN SERPL-MCNC: 14.6 MG/DL (ref 6–20)
CALCIUM SERPL-MCNC: 10.7 MG/DL (ref 8.8–10.4)
CHLORIDE SERPL-SCNC: 103 MMOL/L (ref 98–107)
CREAT SERPL-MCNC: 1.03 MG/DL (ref 0.51–0.95)
CREAT UR-MCNC: 138 MG/DL
EGFRCR SERPLBLD CKD-EPI 2021: 64 ML/MIN/1.73M2
GLUCOSE SERPL-MCNC: 109 MG/DL (ref 70–99)
HCO3 SERPL-SCNC: 24 MMOL/L (ref 22–29)
MICROALBUMIN UR-MCNC: 13.1 MG/L
MICROALBUMIN/CREAT UR: 9.49 MG/G CR (ref 0–25)
POTASSIUM SERPL-SCNC: 4.2 MMOL/L (ref 3.4–5.3)
PROT SERPL-MCNC: 7.5 G/DL (ref 6.4–8.3)
SODIUM SERPL-SCNC: 137 MMOL/L (ref 135–145)
T4 FREE SERPL-MCNC: 1.04 NG/DL (ref 0.9–1.7)
TSH SERPL DL<=0.005 MIU/L-ACNC: 2.37 UIU/ML (ref 0.3–4.2)
VIT D+METAB SERPL-MCNC: 24 NG/ML (ref 20–50)